# Patient Record
Sex: FEMALE | Race: WHITE | NOT HISPANIC OR LATINO
[De-identification: names, ages, dates, MRNs, and addresses within clinical notes are randomized per-mention and may not be internally consistent; named-entity substitution may affect disease eponyms.]

---

## 2017-03-06 ENCOUNTER — APPOINTMENT (OUTPATIENT)
Dept: PULMONOLOGY | Facility: CLINIC | Age: 42
End: 2017-03-06

## 2017-09-29 ENCOUNTER — APPOINTMENT (OUTPATIENT)
Dept: ORTHOPEDIC SURGERY | Facility: CLINIC | Age: 42
End: 2017-09-29

## 2017-10-22 ENCOUNTER — INPATIENT (INPATIENT)
Facility: HOSPITAL | Age: 42
LOS: 3 days | Discharge: HOME | End: 2017-10-26
Attending: HOSPITALIST | Admitting: HOSPITALIST
Payer: MEDICARE

## 2017-10-22 VITALS
TEMPERATURE: 98 F | HEART RATE: 92 BPM | SYSTOLIC BLOOD PRESSURE: 153 MMHG | DIASTOLIC BLOOD PRESSURE: 93 MMHG | OXYGEN SATURATION: 98 % | RESPIRATION RATE: 20 BRPM

## 2017-10-22 LAB
ALBUMIN SERPL ELPH-MCNC: 4.2 G/DL — SIGNIFICANT CHANGE UP (ref 3.3–5)
ALP SERPL-CCNC: 145 U/L — HIGH (ref 40–120)
ALT FLD-CCNC: 17 U/L — SIGNIFICANT CHANGE UP (ref 4–33)
AST SERPL-CCNC: 21 U/L — SIGNIFICANT CHANGE UP (ref 4–32)
BASOPHILS # BLD AUTO: 0.06 K/UL — SIGNIFICANT CHANGE UP (ref 0–0.2)
BASOPHILS NFR BLD AUTO: 0.8 % — SIGNIFICANT CHANGE UP (ref 0–2)
BILIRUB SERPL-MCNC: 0.2 MG/DL — SIGNIFICANT CHANGE UP (ref 0.2–1.2)
BUN SERPL-MCNC: 13 MG/DL — SIGNIFICANT CHANGE UP (ref 7–23)
CALCIUM SERPL-MCNC: 8.9 MG/DL — SIGNIFICANT CHANGE UP (ref 8.4–10.5)
CHLORIDE SERPL-SCNC: 103 MMOL/L — SIGNIFICANT CHANGE UP (ref 98–107)
CO2 SERPL-SCNC: 24 MMOL/L — SIGNIFICANT CHANGE UP (ref 22–31)
CREAT SERPL-MCNC: 1.18 MG/DL — SIGNIFICANT CHANGE UP (ref 0.5–1.3)
EOSINOPHIL # BLD AUTO: 0 K/UL — SIGNIFICANT CHANGE UP (ref 0–0.5)
EOSINOPHIL NFR BLD AUTO: 0 % — SIGNIFICANT CHANGE UP (ref 0–6)
GLUCOSE SERPL-MCNC: 111 MG/DL — HIGH (ref 70–99)
HBA1C BLD-MCNC: 5.5 % — SIGNIFICANT CHANGE UP (ref 4–5.6)
HCG SERPL-ACNC: < 5 MIU/ML — SIGNIFICANT CHANGE UP
HCT VFR BLD CALC: 44.2 % — SIGNIFICANT CHANGE UP (ref 34.5–45)
HGB BLD-MCNC: 14.6 G/DL — SIGNIFICANT CHANGE UP (ref 11.5–15.5)
IMM GRANULOCYTES # BLD AUTO: 0.03 # — SIGNIFICANT CHANGE UP
IMM GRANULOCYTES NFR BLD AUTO: 0.4 % — SIGNIFICANT CHANGE UP (ref 0–1.5)
LYMPHOCYTES # BLD AUTO: 3.03 K/UL — SIGNIFICANT CHANGE UP (ref 1–3.3)
LYMPHOCYTES # BLD AUTO: 40.9 % — SIGNIFICANT CHANGE UP (ref 13–44)
MCHC RBC-ENTMCNC: 29.9 PG — SIGNIFICANT CHANGE UP (ref 27–34)
MCHC RBC-ENTMCNC: 33 % — SIGNIFICANT CHANGE UP (ref 32–36)
MCV RBC AUTO: 90.6 FL — SIGNIFICANT CHANGE UP (ref 80–100)
MONOCYTES # BLD AUTO: 0.69 K/UL — SIGNIFICANT CHANGE UP (ref 0–0.9)
MONOCYTES NFR BLD AUTO: 9.3 % — SIGNIFICANT CHANGE UP (ref 2–14)
NEUTROPHILS # BLD AUTO: 3.6 K/UL — SIGNIFICANT CHANGE UP (ref 1.8–7.4)
NEUTROPHILS NFR BLD AUTO: 48.6 % — SIGNIFICANT CHANGE UP (ref 43–77)
NRBC # FLD: 0 — SIGNIFICANT CHANGE UP
PLATELET # BLD AUTO: 215 K/UL — SIGNIFICANT CHANGE UP (ref 150–400)
PMV BLD: 9.1 FL — SIGNIFICANT CHANGE UP (ref 7–13)
POTASSIUM SERPL-MCNC: 4.1 MMOL/L — SIGNIFICANT CHANGE UP (ref 3.5–5.3)
POTASSIUM SERPL-SCNC: 4.1 MMOL/L — SIGNIFICANT CHANGE UP (ref 3.5–5.3)
PROT SERPL-MCNC: 7.3 G/DL — SIGNIFICANT CHANGE UP (ref 6–8.3)
RBC # BLD: 4.88 M/UL — SIGNIFICANT CHANGE UP (ref 3.8–5.2)
RBC # FLD: 13.2 % — SIGNIFICANT CHANGE UP (ref 10.3–14.5)
SODIUM SERPL-SCNC: 141 MMOL/L — SIGNIFICANT CHANGE UP (ref 135–145)
WBC # BLD: 7.41 K/UL — SIGNIFICANT CHANGE UP (ref 3.8–10.5)
WBC # FLD AUTO: 7.41 K/UL — SIGNIFICANT CHANGE UP (ref 3.8–10.5)

## 2017-10-22 PROCEDURE — 71020: CPT | Mod: 26

## 2017-10-22 RX ORDER — LAMOTRIGINE 25 MG/1
100 TABLET, ORALLY DISINTEGRATING ORAL
Qty: 0 | Refills: 0 | Status: DISCONTINUED | OUTPATIENT
Start: 2017-10-22 | End: 2017-10-26

## 2017-10-22 RX ORDER — MAGNESIUM SULFATE 500 MG/ML
2 VIAL (ML) INJECTION ONCE
Qty: 0 | Refills: 0 | Status: COMPLETED | OUTPATIENT
Start: 2017-10-22 | End: 2017-10-22

## 2017-10-22 RX ORDER — ALBUTEROL 90 UG/1
2.5 AEROSOL, METERED ORAL ONCE
Qty: 0 | Refills: 0 | Status: COMPLETED | OUTPATIENT
Start: 2017-10-22 | End: 2017-10-22

## 2017-10-22 RX ORDER — IPRATROPIUM/ALBUTEROL SULFATE 18-103MCG
3 AEROSOL WITH ADAPTER (GRAM) INHALATION ONCE
Qty: 0 | Refills: 0 | Status: COMPLETED | OUTPATIENT
Start: 2017-10-22 | End: 2017-10-22

## 2017-10-22 RX ORDER — KETOROLAC TROMETHAMINE 30 MG/ML
15 SYRINGE (ML) INJECTION ONCE
Qty: 0 | Refills: 0 | Status: DISCONTINUED | OUTPATIENT
Start: 2017-10-22 | End: 2017-10-22

## 2017-10-22 RX ORDER — IPRATROPIUM/ALBUTEROL SULFATE 18-103MCG
3 AEROSOL WITH ADAPTER (GRAM) INHALATION ONCE
Qty: 0 | Refills: 0 | Status: COMPLETED | OUTPATIENT
Start: 2017-10-22 | End: 2017-10-23

## 2017-10-22 RX ORDER — SODIUM CHLORIDE 9 MG/ML
1000 INJECTION INTRAMUSCULAR; INTRAVENOUS; SUBCUTANEOUS
Qty: 0 | Refills: 0 | Status: DISCONTINUED | OUTPATIENT
Start: 2017-10-22 | End: 2017-10-25

## 2017-10-22 RX ORDER — MIRTAZAPINE 45 MG/1
15 TABLET, ORALLY DISINTEGRATING ORAL AT BEDTIME
Qty: 0 | Refills: 0 | Status: DISCONTINUED | OUTPATIENT
Start: 2017-10-22 | End: 2017-10-25

## 2017-10-22 RX ORDER — FLUOXETINE HCL 10 MG
40 CAPSULE ORAL DAILY
Qty: 0 | Refills: 0 | Status: DISCONTINUED | OUTPATIENT
Start: 2017-10-22 | End: 2017-10-26

## 2017-10-22 RX ORDER — CLONAZEPAM 1 MG
1.5 TABLET ORAL
Qty: 0 | Refills: 0 | Status: DISCONTINUED | OUTPATIENT
Start: 2017-10-22 | End: 2017-10-26

## 2017-10-22 RX ADMIN — Medication 3 MILLILITER(S): at 19:31

## 2017-10-22 RX ADMIN — Medication 1.5 MILLIGRAM(S): at 23:47

## 2017-10-22 RX ADMIN — Medication 3 MILLILITER(S): at 21:53

## 2017-10-22 RX ADMIN — Medication 15 MILLIGRAM(S): at 21:31

## 2017-10-22 RX ADMIN — Medication 15 MILLIGRAM(S): at 21:50

## 2017-10-22 RX ADMIN — MIRTAZAPINE 15 MILLIGRAM(S): 45 TABLET, ORALLY DISINTEGRATING ORAL at 23:47

## 2017-10-22 RX ADMIN — Medication 125 MILLIGRAM(S): at 19:44

## 2017-10-22 RX ADMIN — LAMOTRIGINE 100 MILLIGRAM(S): 25 TABLET, ORALLY DISINTEGRATING ORAL at 23:47

## 2017-10-22 RX ADMIN — Medication 3 MILLILITER(S): at 19:25

## 2017-10-22 RX ADMIN — Medication 50 GRAM(S): at 19:45

## 2017-10-22 RX ADMIN — SODIUM CHLORIDE 125 MILLILITER(S): 9 INJECTION INTRAMUSCULAR; INTRAVENOUS; SUBCUTANEOUS at 21:53

## 2017-10-22 RX ADMIN — ALBUTEROL 2.5 MILLIGRAM(S): 90 AEROSOL, METERED ORAL at 19:35

## 2017-10-22 RX ADMIN — ALBUTEROL 2.5 MILLIGRAM(S): 90 AEROSOL, METERED ORAL at 19:44

## 2017-10-22 NOTE — ED CDU PROVIDER INITIAL DAY NOTE - OBJECTIVE STATEMENT
42 yr old female smoker with pmhx of asthma never been intubated, has been hospitalized for asthma exacerbation,  bipolar, presents to ed c/o sob, chest tightness, productive cough and fatigue x 2 days.  pt states sx 5/10 compare to prior. used pump 3x/day today with mild relief, states that usually the trigger is cold weather, and this time she also had a cold + uri sx (rhinorrhea, congestion, sinus tenderness, headache).  currently denies any headaches, neck pain, visual disturbances,  fever, chills, n/v/d, chest pain, abdominal pain, urinary symptoms, numbness/weakness/tingling, recent travel, sick contact, social history. IN ER pt given nebs, steroids, magnesium peak flow increased to 170, pt still wheezing, pt in cdu for continued nebs, steroids, fluids, reassesment

## 2017-10-22 NOTE — ED CDU PROVIDER INITIAL DAY NOTE - PHYSICAL EXAMINATION
resp- bilateral diffuse wheezing,  not currently using accesory muscles,  speaking in full sentences sat 98% with nebs peak flow 170

## 2017-10-22 NOTE — ED PROVIDER NOTE - CONSTITUTIONAL, MLM
normal... well nourished, awake, alert, oriented to person, place, time/situation and mild apparent distress.

## 2017-10-22 NOTE — ED PROVIDER NOTE - PHYSICAL EXAMINATION
resp- bilateral diffuse wheezing, costal retractions, rr 22, speaking in full sentences sat 98% with nebs

## 2017-10-22 NOTE — ED ADULT TRIAGE NOTE - CHIEF COMPLAINT QUOTE
pt c/o chest tightness, sob x 2 days. pmhx asthma, bipolar. pt sts been using ventolin pump with no relief. 02 98% on RA at this time

## 2017-10-22 NOTE — ED CDU PROVIDER INITIAL DAY NOTE - ATTENDING CONTRIBUTION TO CARE
[place]: pt with asthma exacerbation.  continue with nebs, steroids and monitor Peak flow meter for improvement.

## 2017-10-22 NOTE — ED CDU PROVIDER INITIAL DAY NOTE - ENMT, MLM
Airway patent, Nasal mucosa mild hyperturbinate. Mouth with normal mucosa. Throat has no vesicles, no oropharyngeal exudates and uvula is midline.  bilateral frontal and maxillary sinus tenderness

## 2017-10-22 NOTE — ED ADULT NURSE NOTE - OBJECTIVE STATEMENT
Pt received in intake spot 12. Alert and oriented x3, ambulatory. Co asthma exacerbation x 3 days. States she has been using her inhaler with no relief. Does not have a nebulizer at home. Wheezing auscultated. Peak flow 180 prior to nebs. Co tightness to chest when breathing. Pt also became tearful and expressed that yesterday she took 4 of her 15mg Remeron at 0230 am. States "my daughter has been drinking alcohol and my other daughter cuts herself. I am just very overwhelmed. I took the pills because I wanted to fall asleep". Denies SI or HI. States she has been admitted in the past for suicidal thoughts. MD [place] made aware. Labs sent. IV placed. Continuous pulse oximetry maintained. Will monitor. Pt received in intake spot 12. Alert and oriented x3, ambulatory. Co asthma exacerbation x 3 days. States she has been using her inhaler with no relief. Does not have a nebulizer at home. Wheezing auscultated. Peak flow 180 prior to nebs. Co tightness to chest when breathing. Pt also became tearful and expressed that yesterday she took 4 of her 15mg Remeron at 0230 am. States "my daughter has been drinking alcohol and my other daughter cuts herself. I am just very overwhelmed. I took the pills because I wanted to fall asleep". States this was not a suicide attempt.  Denies SI or HI. States she has been admitted in the past for suicidal thoughts. MD [place] made aware. Labs sent. IV placed. Continuous pulse oximetry maintained. Will monitor.

## 2017-10-22 NOTE — ED PROVIDER NOTE - OBJECTIVE STATEMENT
42 yr old female smoker with hx of asthma never been intubated, bipolar, asthma presents to ed c/o sob, chest tightness, productive cough and fatigue x 2 days.  pt states sx 5/10 compare to prior. used pump 3x/day today. + uri sx (rhinorrhea, congestion, sinus tenderness, headache).  no fever, no chills, no rash, no joint pain, no n/v, no palpitations, no abd pain, no dysuria.

## 2017-10-22 NOTE — ED CDU PROVIDER INITIAL DAY NOTE - ENMT NEGATIVE STATEMENT, MLM
Ears: no ear pain and no hearing problems.Nose: no nasal congestion and no nasal drainage.Mouth/Throat: no dysphagia, no hoarseness and no throat pain.Neck: no lumps, no pain, no stiffness and no swollen glands

## 2017-10-22 NOTE — ED PROVIDER NOTE - PROGRESS NOTE DETAILS
[place]: pt mention to RN that she was having a lot of social stress ( daughter giving her issues and having verbal altercation with significant other last night) pt told boyfriend that she wants everything to end took 4 tablets of remeron instead of 1.  denies of ever having gastric lavage or medical admission for psych related or OD.  pt had 1 OD once but never tried to physically hurt self.  pt denies any physical abuse. [place]: pt still wheezing cxr clear.  sat 98% RA but subjective dyspnea.  will admit to CDU for asthma exacerbation.

## 2017-10-22 NOTE — ED PROVIDER NOTE - MEDICAL DECISION MAKING DETAILS
42 yr old female smoker with hx of asthma never been intubated, bipolar, asthma presents to ed c/o sob, chest tightness, productive cough and fatigue x 2 days.  pt states sx 5/10 compare to prior. used pump 3x/day today. + uri sx (rhinorrhea, congestion, sinus tenderness, headache).  no fever, no chills, no rash, no joint pain, no n/v, no palpitations, no abd pain, no dysuria.    pt with asthma sx but also with DM and active smoker with uri sx will treat with nebs, steroids, mag, pulse ox, obtain cxr to r/o pna as pt states always get a pna.

## 2017-10-23 DIAGNOSIS — J44.1 CHRONIC OBSTRUCTIVE PULMONARY DISEASE WITH (ACUTE) EXACERBATION: ICD-10-CM

## 2017-10-23 DIAGNOSIS — F31.9 BIPOLAR DISORDER, UNSPECIFIED: ICD-10-CM

## 2017-10-23 DIAGNOSIS — R74.8 ABNORMAL LEVELS OF OTHER SERUM ENZYMES: ICD-10-CM

## 2017-10-23 DIAGNOSIS — J34.89 OTHER SPECIFIED DISORDERS OF NOSE AND NASAL SINUSES: ICD-10-CM

## 2017-10-23 PROCEDURE — 99223 1ST HOSP IP/OBS HIGH 75: CPT

## 2017-10-23 RX ORDER — ALBUTEROL 90 UG/1
2.5 AEROSOL, METERED ORAL EVERY 4 HOURS
Qty: 0 | Refills: 0 | Status: DISCONTINUED | OUTPATIENT
Start: 2017-10-23 | End: 2017-10-23

## 2017-10-23 RX ORDER — CLONAZEPAM 1 MG
1 TABLET ORAL
Qty: 0 | Refills: 0 | COMMUNITY

## 2017-10-23 RX ORDER — LAMOTRIGINE 25 MG/1
1 TABLET, ORALLY DISINTEGRATING ORAL
Qty: 0 | Refills: 0 | COMMUNITY

## 2017-10-23 RX ORDER — AZITHROMYCIN 500 MG/1
500 TABLET, FILM COATED ORAL EVERY 24 HOURS
Qty: 0 | Refills: 0 | Status: DISCONTINUED | OUTPATIENT
Start: 2017-10-24 | End: 2017-10-26

## 2017-10-23 RX ORDER — AZITHROMYCIN 500 MG/1
500 TABLET, FILM COATED ORAL ONCE
Qty: 0 | Refills: 0 | Status: COMPLETED | OUTPATIENT
Start: 2017-10-23 | End: 2017-10-23

## 2017-10-23 RX ORDER — IPRATROPIUM/ALBUTEROL SULFATE 18-103MCG
3 AEROSOL WITH ADAPTER (GRAM) INHALATION ONCE
Qty: 0 | Refills: 0 | Status: COMPLETED | OUTPATIENT
Start: 2017-10-23 | End: 2017-10-23

## 2017-10-23 RX ORDER — BUDESONIDE AND FORMOTEROL FUMARATE DIHYDRATE 160; 4.5 UG/1; UG/1
2 AEROSOL RESPIRATORY (INHALATION)
Qty: 0 | Refills: 0 | Status: DISCONTINUED | OUTPATIENT
Start: 2017-10-23 | End: 2017-10-26

## 2017-10-23 RX ORDER — ALBUTEROL 90 UG/1
2.5 AEROSOL, METERED ORAL
Qty: 0 | Refills: 0 | Status: DISCONTINUED | OUTPATIENT
Start: 2017-10-23 | End: 2017-10-24

## 2017-10-23 RX ORDER — CEFTRIAXONE 500 MG/1
1 INJECTION, POWDER, FOR SOLUTION INTRAMUSCULAR; INTRAVENOUS EVERY 24 HOURS
Qty: 0 | Refills: 0 | Status: DISCONTINUED | OUTPATIENT
Start: 2017-10-23 | End: 2017-10-24

## 2017-10-23 RX ORDER — MIRTAZAPINE 45 MG/1
1 TABLET, ORALLY DISINTEGRATING ORAL
Qty: 0 | Refills: 0 | COMMUNITY

## 2017-10-23 RX ORDER — ALBUTEROL 90 UG/1
2.5 AEROSOL, METERED ORAL EVERY 6 HOURS
Qty: 0 | Refills: 0 | Status: DISCONTINUED | OUTPATIENT
Start: 2017-10-23 | End: 2017-10-23

## 2017-10-23 RX ORDER — FLUTICASONE PROPIONATE 50 MCG
1 SPRAY, SUSPENSION NASAL
Qty: 0 | Refills: 0 | Status: DISCONTINUED | OUTPATIENT
Start: 2017-10-23 | End: 2017-10-26

## 2017-10-23 RX ORDER — ENOXAPARIN SODIUM 100 MG/ML
40 INJECTION SUBCUTANEOUS EVERY 24 HOURS
Qty: 0 | Refills: 0 | Status: DISCONTINUED | OUTPATIENT
Start: 2017-10-23 | End: 2017-10-26

## 2017-10-23 RX ORDER — CLONAZEPAM 1 MG
3 TABLET ORAL
Qty: 0 | Refills: 0 | COMMUNITY

## 2017-10-23 RX ORDER — INFLUENZA VIRUS VACCINE 15; 15; 15; 15 UG/.5ML; UG/.5ML; UG/.5ML; UG/.5ML
0.5 SUSPENSION INTRAMUSCULAR ONCE
Qty: 0 | Refills: 0 | Status: DISCONTINUED | OUTPATIENT
Start: 2017-10-23 | End: 2017-10-26

## 2017-10-23 RX ORDER — FLUOXETINE HCL 10 MG
1 CAPSULE ORAL
Qty: 0 | Refills: 0 | COMMUNITY

## 2017-10-23 RX ORDER — ALBUTEROL 90 UG/1
2 AEROSOL, METERED ORAL
Qty: 0 | Refills: 0 | COMMUNITY

## 2017-10-23 RX ORDER — IPRATROPIUM/ALBUTEROL SULFATE 18-103MCG
3 AEROSOL WITH ADAPTER (GRAM) INHALATION ONCE
Qty: 0 | Refills: 0 | Status: DISCONTINUED | OUTPATIENT
Start: 2017-10-23 | End: 2017-10-24

## 2017-10-23 RX ORDER — ALBUTEROL 90 UG/1
2.5 AEROSOL, METERED ORAL EVERY 4 HOURS
Qty: 0 | Refills: 0 | Status: COMPLETED | OUTPATIENT
Start: 2017-10-23 | End: 2017-10-24

## 2017-10-23 RX ORDER — IBUPROFEN 200 MG
600 TABLET ORAL ONCE
Qty: 0 | Refills: 0 | Status: COMPLETED | OUTPATIENT
Start: 2017-10-23 | End: 2017-10-23

## 2017-10-23 RX ORDER — SODIUM CHLORIDE 0.65 %
1 AEROSOL, SPRAY (ML) NASAL EVERY 6 HOURS
Qty: 0 | Refills: 0 | Status: COMPLETED | OUTPATIENT
Start: 2017-10-23 | End: 2017-10-25

## 2017-10-23 RX ADMIN — Medication 3 MILLILITER(S): at 04:46

## 2017-10-23 RX ADMIN — Medication 1.5 MILLIGRAM(S): at 09:16

## 2017-10-23 RX ADMIN — Medication 1.5 MILLIGRAM(S): at 17:36

## 2017-10-23 RX ADMIN — Medication 60 MILLIGRAM(S): at 09:16

## 2017-10-23 RX ADMIN — Medication 600 MILLIGRAM(S): at 15:12

## 2017-10-23 RX ADMIN — BUDESONIDE AND FORMOTEROL FUMARATE DIHYDRATE 2 PUFF(S): 160; 4.5 AEROSOL RESPIRATORY (INHALATION) at 22:16

## 2017-10-23 RX ADMIN — SODIUM CHLORIDE 125 MILLILITER(S): 9 INJECTION INTRAMUSCULAR; INTRAVENOUS; SUBCUTANEOUS at 05:44

## 2017-10-23 RX ADMIN — Medication 60 MILLIGRAM(S): at 17:36

## 2017-10-23 RX ADMIN — ENOXAPARIN SODIUM 40 MILLIGRAM(S): 100 INJECTION SUBCUTANEOUS at 21:49

## 2017-10-23 RX ADMIN — CEFTRIAXONE 100 GRAM(S): 500 INJECTION, POWDER, FOR SOLUTION INTRAMUSCULAR; INTRAVENOUS at 21:49

## 2017-10-23 RX ADMIN — LAMOTRIGINE 100 MILLIGRAM(S): 25 TABLET, ORALLY DISINTEGRATING ORAL at 09:16

## 2017-10-23 RX ADMIN — Medication 1 SPRAY(S): at 22:17

## 2017-10-23 RX ADMIN — SODIUM CHLORIDE 125 MILLILITER(S): 9 INJECTION INTRAMUSCULAR; INTRAVENOUS; SUBCUTANEOUS at 21:49

## 2017-10-23 RX ADMIN — Medication 40 MILLIGRAM(S): at 12:27

## 2017-10-23 RX ADMIN — MIRTAZAPINE 15 MILLIGRAM(S): 45 TABLET, ORALLY DISINTEGRATING ORAL at 22:16

## 2017-10-23 RX ADMIN — AZITHROMYCIN 250 MILLIGRAM(S): 500 TABLET, FILM COATED ORAL at 09:18

## 2017-10-23 RX ADMIN — ALBUTEROL 2.5 MILLIGRAM(S): 90 AEROSOL, METERED ORAL at 15:17

## 2017-10-23 RX ADMIN — ALBUTEROL 2.5 MILLIGRAM(S): 90 AEROSOL, METERED ORAL at 21:26

## 2017-10-23 RX ADMIN — Medication 600 MILLIGRAM(S): at 15:42

## 2017-10-23 RX ADMIN — ALBUTEROL 2.5 MILLIGRAM(S): 90 AEROSOL, METERED ORAL at 09:16

## 2017-10-23 RX ADMIN — LAMOTRIGINE 100 MILLIGRAM(S): 25 TABLET, ORALLY DISINTEGRATING ORAL at 20:31

## 2017-10-23 RX ADMIN — Medication 3 MILLILITER(S): at 06:04

## 2017-10-23 RX ADMIN — Medication 60 MILLIGRAM(S): at 03:54

## 2017-10-23 NOTE — H&P ADULT - NSHPPHYSICALEXAM_GEN_ALL_CORE
EXAM:    RAD  CHEST  PA  LAT      PROCEDURE  DATE:    Oct  22  2017        INTERPRETATION:    CLINICAL  INFORMATION:  Dyspnea  x2  days.    EXAM:  PA  and  lateral  views  of  the  chest  from  10/22/2017.    COMPARISON:  Chest  radiograph  from  7/27/2016    FINDINGS:  The  lungs  are  clear.  There  are  no  pleural  effusions  or  pneumothorax.  The  cardiomediastinal  silhouette  is  unchanged.  Degenerative  changes  of  the  spine.    IMPRESSION:  Clear  lungs. PHYSICAL EXAM:      Constitutional: NAD, well-groomed, well-developed  HEENT: PERRLA, EOMI, Normal Hearing  Neck: No LAD, No JVD  Back: Normal spine flexure, No CVA tenderness  Respiratory: diffusely congested lung sounds with scarred wheezing  Cardiovascular: S1 and S2, RRR  Gastrointestinal: BS+, soft, NT/ND  Extremities: No peripheral edema  Vascular: 2+ peripheral pulses  Neurological: A/O x 3, no focal deficits  Psychiatric: Normal mood, normal affect;   Musculoskeletal: 5/5 strength b/l upper and lower extremities  Skin: No rashes

## 2017-10-23 NOTE — ED CDU PROVIDER SUBSEQUENT DAY NOTE - ATTENDING CONTRIBUTION TO CARE
pt with a h/o COPD (in ED thought to be asthma), smoker, p/w COPD exac. received nebs, steroids in ED. pt feels somewhat improved, but still SOB when going to the bathroom this am. Does not feel at her baseline. She says she gets admitted for 3-5 days twice a year for exacerbations, either due to weather or from URI induced COPDe. She takes advair occasionally but is not on any daily controller med. exam, vs wnl, nad, speaking full sentences. moderate wheeze and tight b/l. hs normal. Will continue regular nebs/steroids. Add Abx given COPD exac. If no improvement or worsening today will admit.

## 2017-10-23 NOTE — H&P ADULT - NSHPOUTPATIENTPROVIDERS_GEN_ALL_CORE
Javad Laureano-[facility]  Health Care Provider- Verenice Flowers? [phone]

## 2017-10-23 NOTE — H&P ADULT - NSHPREVIEWOFSYSTEMS_GEN_ALL_CORE
Review of Systems:   CONSTITUTIONAL: No fever, weight loss, or fatigue  EYES: No eye pain, visual disturbances, or discharge  ENMT:  No difficulty hearing, tinnitus, vertigo; No sinus or throat pain  NECK: No pain or stiffness  BREASTS: No pain, masses, or nipple discharge  RESPIRATORY: No cough, wheezing, chills or hemoptysis; No shortness of breath  CARDIOVASCULAR: No chest pain, palpitations, dizziness, or leg swelling  GASTROINTESTINAL: No abdominal or epigastric pain. No nausea, vomiting, or hematemesis; No diarrhea or constipation. No melena or hematochezia.  GENITOURINARY: No dysuria, frequency, hematuria, or incontinence  NEUROLOGICAL: No headaches, memory loss, loss of strength, numbness, or tremors  SKIN: No itching, burning, rashes, or lesions   LYMPH NODES: No enlarged glands  ENDOCRINE: No heat or cold intolerance; No hair loss  MUSCULOSKELETAL: No joint pain or swelling; No muscle, back, or extremity pain  PSYCHIATRIC: No depression, anxiety, mood swings, or difficulty sleeping  HEME/LYMPH: No easy bruising, or bleeding gums  ALLERY AND IMMUNOLOGIC: No hives or eczema Review of Systems:   CONSTITUTIONAL: No fever  EYES: No eye pain, visual disturbances, or discharge;  ENMT:  No difficulty hearing, tinnitus, vertigo; No sinus or throat pain;  frontal and maxillary region pressure  NECK: No pain or stiffness  RESPIRATORY: +cough, wheezing, chills shortness of breath  CARDIOVASCULAR: No chest pain, palpitations, dizziness, or leg swelling  GASTROINTESTINAL: No abdominal or epigastric pain. No nausea, vomiting, or hematemesis; No diarrhea or constipation. No melena or hematochezia.  GENITOURINARY: No dysuria, frequency, hematuria, or incontinence  NEUROLOGICAL: No memory loss, loss of strength, numbness, or tremors  SKIN: No itching, burning, rashes, or lesions   LYMPH NODES: No enlarged glands  ENDOCRINE: No heat or cold intolerance; No hair loss  MUSCULOSKELETAL: No joint pain or swelling; No muscle, back, or extremity pain  HEME/LYMPH: No easy bruising, or bleeding gums  ALLERY AND IMMUNOLOGIC: No hives or eczema

## 2017-10-23 NOTE — ED CDU PROVIDER DISPOSITION NOTE - CLINICAL COURSE
presented with COPD exacerbation. got nebs/steroids and did not improve. no acute worsening, no resp distress, could not walk w/o getting SOB. Typically hospitalized 3-5 days. Therefore admitted to medicine for continued management. No pulmonary unit beds available at this time.

## 2017-10-23 NOTE — ED CDU PROVIDER SUBSEQUENT DAY NOTE - BREATH SOUNDS
ins[p and exp wheezes over b/l lung fileds, but moves air well, speaks in full sentences, in no acute resp distress

## 2017-10-23 NOTE — ED CDU PROVIDER SUBSEQUENT DAY NOTE - HISTORY
Pt signed out to me by CIELO Curran at the shift change; Pt 41 y/o female with PMhx of asthma (never intubated) also hx of bipolar disorder, possible COPD (as per pt's pulm) here for eval/asthma exacerbation, still wheezing and sob while walking. Pt received mag, duonebs and steroids, minimal improvement,. Still smokes about 1/4 ppd;

## 2017-10-23 NOTE — H&P ADULT - PROBLEM SELECTOR PLAN 2
-does not meet criteria  for bacterial sinusitis  -will initiate saline irrigation, intranasal steroids; Robitussin prn

## 2017-10-23 NOTE — H&P ADULT - HISTORY OF PRESENT ILLNESS
41 yo f with obesity, diet controlled DM (following  weight loss), asthma/copd, current smoker,  bipolar disorder.  Pt with productive cough, wheezing,  for last 3 days similar to prior copd exacerbations. Attributes onset to both routine exercise regimen as well as URI/ rhinosinusitis like symptoms that began shortly prior to attack. Notes similar types of triggers 5-6 times a year. When well-controlled, reports use of rescue  inhaler 1-2 times/daily often in response to exertional stimulus such as exercise. However only uses Advair 2-3 times/weekly when she anticipates "feeling tight". Denies fevers, myalgias, nausea, vomiting. Endorses frontal and maxillary sinus region pressure which she describes as typical of prior exacerbations. CXR with no PNA     Pt teary-eyed initially; depressed because no family-members have visited her; denies SI or intent to self harm

## 2017-10-23 NOTE — H&P ADULT - NSHPLABSRESULTS_GEN_ALL_CORE
EXAM:    RAD  CHEST  PA  LAT      PROCEDURE  DATE:    Oct  22  2017        INTERPRETATION:    CLINICAL  INFORMATION:  Dyspnea  x2  days.    EXAM:  PA  and  lateral  views  of  the  chest  from  10/22/2017.    COMPARISON:  Chest  radiograph  from  7/27/2016    FINDINGS:  The  lungs  are  clear.  There  are  no  pleural  effusions  or  pneumothorax.  The  cardiomediastinal  silhouette  is  unchanged.  Degenerative  changes  of  the  spine.    IMPRESSION:  Clear  lungs.

## 2017-10-23 NOTE — H&P ADULT - PROBLEM SELECTOR PLAN 1
-continue standing and prn nebs, systemic steroids, azithromycin, O2 prn  -continue LABA/inhaled steroids; counseled that this med is standing rather than prn  -will require close-followup with pulmonologist following discharge

## 2017-10-24 DIAGNOSIS — J45.901 UNSPECIFIED ASTHMA WITH (ACUTE) EXACERBATION: ICD-10-CM

## 2017-10-24 LAB
ALBUMIN SERPL ELPH-MCNC: 3.7 G/DL — SIGNIFICANT CHANGE UP (ref 3.3–5)
ALP SERPL-CCNC: 112 U/L — SIGNIFICANT CHANGE UP (ref 40–120)
ALT FLD-CCNC: 15 U/L — SIGNIFICANT CHANGE UP (ref 4–33)
AST SERPL-CCNC: 15 U/L — SIGNIFICANT CHANGE UP (ref 4–32)
B PERT DNA SPEC QL NAA+PROBE: SIGNIFICANT CHANGE UP
BILIRUB DIRECT SERPL-MCNC: < 0.1 MG/DL — LOW (ref 0.1–0.2)
BILIRUB SERPL-MCNC: < 0.2 MG/DL — LOW (ref 0.2–1.2)
BUN SERPL-MCNC: 15 MG/DL — SIGNIFICANT CHANGE UP (ref 7–23)
C PNEUM DNA SPEC QL NAA+PROBE: NOT DETECTED — SIGNIFICANT CHANGE UP
CALCIUM SERPL-MCNC: 8.4 MG/DL — SIGNIFICANT CHANGE UP (ref 8.4–10.5)
CHLORIDE SERPL-SCNC: 107 MMOL/L — SIGNIFICANT CHANGE UP (ref 98–107)
CO2 SERPL-SCNC: 20 MMOL/L — LOW (ref 22–31)
CREAT SERPL-MCNC: 0.76 MG/DL — SIGNIFICANT CHANGE UP (ref 0.5–1.3)
FLUAV H1 2009 PAND RNA SPEC QL NAA+PROBE: NOT DETECTED — SIGNIFICANT CHANGE UP
FLUAV H1 RNA SPEC QL NAA+PROBE: NOT DETECTED — SIGNIFICANT CHANGE UP
FLUAV H3 RNA SPEC QL NAA+PROBE: NOT DETECTED — SIGNIFICANT CHANGE UP
FLUAV SUBTYP SPEC NAA+PROBE: SIGNIFICANT CHANGE UP
FLUBV RNA SPEC QL NAA+PROBE: NOT DETECTED — SIGNIFICANT CHANGE UP
GGT SERPL-CCNC: 20 U/L — SIGNIFICANT CHANGE UP (ref 5–36)
GLUCOSE SERPL-MCNC: 176 MG/DL — HIGH (ref 70–99)
HADV DNA SPEC QL NAA+PROBE: NOT DETECTED — SIGNIFICANT CHANGE UP
HCOV 229E RNA SPEC QL NAA+PROBE: NOT DETECTED — SIGNIFICANT CHANGE UP
HCOV HKU1 RNA SPEC QL NAA+PROBE: NOT DETECTED — SIGNIFICANT CHANGE UP
HCOV NL63 RNA SPEC QL NAA+PROBE: NOT DETECTED — SIGNIFICANT CHANGE UP
HCOV OC43 RNA SPEC QL NAA+PROBE: NOT DETECTED — SIGNIFICANT CHANGE UP
HCT VFR BLD CALC: 40.8 % — SIGNIFICANT CHANGE UP (ref 34.5–45)
HGB BLD-MCNC: 13 G/DL — SIGNIFICANT CHANGE UP (ref 11.5–15.5)
HMPV RNA SPEC QL NAA+PROBE: NOT DETECTED — SIGNIFICANT CHANGE UP
HPIV1 RNA SPEC QL NAA+PROBE: NOT DETECTED — SIGNIFICANT CHANGE UP
HPIV2 RNA SPEC QL NAA+PROBE: NOT DETECTED — SIGNIFICANT CHANGE UP
HPIV3 RNA SPEC QL NAA+PROBE: NOT DETECTED — SIGNIFICANT CHANGE UP
HPIV4 RNA SPEC QL NAA+PROBE: NOT DETECTED — SIGNIFICANT CHANGE UP
LDH SERPL L TO P-CCNC: 219 U/L — SIGNIFICANT CHANGE UP (ref 135–225)
M PNEUMO DNA SPEC QL NAA+PROBE: NOT DETECTED — SIGNIFICANT CHANGE UP
MCHC RBC-ENTMCNC: 29.9 PG — SIGNIFICANT CHANGE UP (ref 27–34)
MCHC RBC-ENTMCNC: 31.9 % — LOW (ref 32–36)
MCV RBC AUTO: 93.8 FL — SIGNIFICANT CHANGE UP (ref 80–100)
NRBC # FLD: 0 — SIGNIFICANT CHANGE UP
PLATELET # BLD AUTO: 202 K/UL — SIGNIFICANT CHANGE UP (ref 150–400)
PMV BLD: 9.3 FL — SIGNIFICANT CHANGE UP (ref 7–13)
POTASSIUM SERPL-MCNC: 4.8 MMOL/L — SIGNIFICANT CHANGE UP (ref 3.5–5.3)
POTASSIUM SERPL-SCNC: 4.8 MMOL/L — SIGNIFICANT CHANGE UP (ref 3.5–5.3)
PROT SERPL-MCNC: 6.5 G/DL — SIGNIFICANT CHANGE UP (ref 6–8.3)
RBC # BLD: 4.35 M/UL — SIGNIFICANT CHANGE UP (ref 3.8–5.2)
RBC # FLD: 13.4 % — SIGNIFICANT CHANGE UP (ref 10.3–14.5)
RSV RNA SPEC QL NAA+PROBE: NOT DETECTED — SIGNIFICANT CHANGE UP
RV+EV RNA SPEC QL NAA+PROBE: POSITIVE — HIGH
SODIUM SERPL-SCNC: 141 MMOL/L — SIGNIFICANT CHANGE UP (ref 135–145)
WBC # BLD: 22.93 K/UL — HIGH (ref 3.8–10.5)
WBC # FLD AUTO: 22.93 K/UL — HIGH (ref 3.8–10.5)

## 2017-10-24 PROCEDURE — 99223 1ST HOSP IP/OBS HIGH 75: CPT | Mod: GC

## 2017-10-24 PROCEDURE — 99233 SBSQ HOSP IP/OBS HIGH 50: CPT

## 2017-10-24 RX ORDER — LORATADINE 10 MG/1
10 TABLET ORAL DAILY
Qty: 0 | Refills: 0 | Status: DISCONTINUED | OUTPATIENT
Start: 2017-10-24 | End: 2017-10-26

## 2017-10-24 RX ORDER — ACETAMINOPHEN 500 MG
650 TABLET ORAL ONCE
Qty: 0 | Refills: 0 | Status: COMPLETED | OUTPATIENT
Start: 2017-10-24 | End: 2017-10-24

## 2017-10-24 RX ORDER — IPRATROPIUM/ALBUTEROL SULFATE 18-103MCG
3 AEROSOL WITH ADAPTER (GRAM) INHALATION EVERY 4 HOURS
Qty: 0 | Refills: 0 | Status: DISCONTINUED | OUTPATIENT
Start: 2017-10-24 | End: 2017-10-26

## 2017-10-24 RX ORDER — SENNA PLUS 8.6 MG/1
2 TABLET ORAL AT BEDTIME
Qty: 0 | Refills: 0 | Status: DISCONTINUED | OUTPATIENT
Start: 2017-10-24 | End: 2017-10-26

## 2017-10-24 RX ORDER — DOCUSATE SODIUM 100 MG
100 CAPSULE ORAL DAILY
Qty: 0 | Refills: 0 | Status: DISCONTINUED | OUTPATIENT
Start: 2017-10-24 | End: 2017-10-26

## 2017-10-24 RX ADMIN — LAMOTRIGINE 100 MILLIGRAM(S): 25 TABLET, ORALLY DISINTEGRATING ORAL at 05:53

## 2017-10-24 RX ADMIN — SENNA PLUS 2 TABLET(S): 8.6 TABLET ORAL at 21:01

## 2017-10-24 RX ADMIN — ENOXAPARIN SODIUM 40 MILLIGRAM(S): 100 INJECTION SUBCUTANEOUS at 21:01

## 2017-10-24 RX ADMIN — Medication 1 SPRAY(S): at 18:11

## 2017-10-24 RX ADMIN — AZITHROMYCIN 250 MILLIGRAM(S): 500 TABLET, FILM COATED ORAL at 10:22

## 2017-10-24 RX ADMIN — Medication 40 MILLIGRAM(S): at 05:54

## 2017-10-24 RX ADMIN — Medication 1.5 MILLIGRAM(S): at 18:11

## 2017-10-24 RX ADMIN — BUDESONIDE AND FORMOTEROL FUMARATE DIHYDRATE 2 PUFF(S): 160; 4.5 AEROSOL RESPIRATORY (INHALATION) at 10:22

## 2017-10-24 RX ADMIN — LAMOTRIGINE 100 MILLIGRAM(S): 25 TABLET, ORALLY DISINTEGRATING ORAL at 18:11

## 2017-10-24 RX ADMIN — Medication 200 MILLIGRAM(S): at 11:47

## 2017-10-24 RX ADMIN — Medication 3 MILLILITER(S): at 18:07

## 2017-10-24 RX ADMIN — SODIUM CHLORIDE 125 MILLILITER(S): 9 INJECTION INTRAMUSCULAR; INTRAVENOUS; SUBCUTANEOUS at 05:53

## 2017-10-24 RX ADMIN — Medication 20 MILLIGRAM(S): at 21:05

## 2017-10-24 RX ADMIN — Medication 3 MILLILITER(S): at 14:17

## 2017-10-24 RX ADMIN — Medication 650 MILLIGRAM(S): at 21:49

## 2017-10-24 RX ADMIN — Medication 1 SPRAY(S): at 11:47

## 2017-10-24 RX ADMIN — Medication 1 SPRAY(S): at 05:54

## 2017-10-24 RX ADMIN — Medication 650 MILLIGRAM(S): at 20:55

## 2017-10-24 RX ADMIN — Medication 1 SPRAY(S): at 05:53

## 2017-10-24 RX ADMIN — BUDESONIDE AND FORMOTEROL FUMARATE DIHYDRATE 2 PUFF(S): 160; 4.5 AEROSOL RESPIRATORY (INHALATION) at 21:05

## 2017-10-24 RX ADMIN — Medication 40 MILLIGRAM(S): at 11:47

## 2017-10-24 RX ADMIN — ALBUTEROL 2.5 MILLIGRAM(S): 90 AEROSOL, METERED ORAL at 01:00

## 2017-10-24 RX ADMIN — Medication 3 MILLILITER(S): at 21:40

## 2017-10-24 RX ADMIN — ALBUTEROL 2.5 MILLIGRAM(S): 90 AEROSOL, METERED ORAL at 10:07

## 2017-10-24 RX ADMIN — MIRTAZAPINE 15 MILLIGRAM(S): 45 TABLET, ORALLY DISINTEGRATING ORAL at 21:01

## 2017-10-24 RX ADMIN — Medication 1.5 MILLIGRAM(S): at 05:53

## 2017-10-24 NOTE — CONSULT NOTE ADULT - ATTENDING COMMENTS
Pt seen and examined, agree with above. 42 F with morbid obesity, likely asthma, current smoker now admitted with URI symptoms and an asthma exacerbation likely 2/2 a URI. Now with diffuse wheezing b/l. Would change steroids to solumedrol 20 mg Q8H IV. Cont Duonebs Q6H and monitor peak flows Q6-8H. Check RVP. Would continue azithromycin for possible bronchitis, no clinical evidence of pneumonia so can hold off on Ceftriaxone. Add flonase BID and continue claritin. Will follow.

## 2017-10-24 NOTE — CONSULT NOTE ADULT - ASSESSMENT
Mami Yao is a 42yF w obesity, bipolar disorder, long-term and current smoking, and asthma and/or COPD who presented on 10/22 w chest tightness and shortness of breath. In hospital, still w significant coughing and wheezing despite systemic steroids, bronchodilators, and antibiotics.     # Management Recommendations  - Continue azithromycin for total of 5 days. (Low suspicion of pneumonia, but either way useful for anti-inflammatory effects)  - Can discontinue ceftriaxone  - Methylprednisolone 20q8h  - continue albuterol/ipratropium standing  - continue fluticasone nasal spray, saline nasal spray  - continue guaifenesin cough syrup  - loratadine    # Diagnostic Recommendations  - Rapid respiratory viral panel  - Defer CT scan for now    # Outpatient Follow-up  - Ensure patient taking inhaled steroid/LABA regularly even when asymptomatic   - Peak-flow measurements regularly at home  - Pulmonology follow-up  - Sleep study

## 2017-10-24 NOTE — PROGRESS NOTE ADULT - SUBJECTIVE AND OBJECTIVE BOX
Patient is a 42y old  Female who presents with a chief complaint of sob/cough (23 Oct 2017 17:11)      SUBJECTIVE / OVERNIGHT EVENTS: still w SOB; feels anxious    MEDICATIONS  (STANDING):  ALBUTerol/ipratropium for Nebulization 3 milliLiter(s) Nebulizer every 4 hours  azithromycin  IVPB 500 milliGRAM(s) IV Intermittent every 24 hours  buDESOnide 160 MICROgram(s)/formoterol 4.5 MICROgram(s) Inhaler 2 Puff(s) Inhalation two times a day  clonazePAM Tablet 1.5 milliGRAM(s) Oral two times a day  enoxaparin Injectable 40 milliGRAM(s) SubCutaneous every 24 hours  FLUoxetine 40 milliGRAM(s) Oral daily  fluticasone propionate 50 MICROgram(s)/spray Nasal Spray 1 Spray(s) Both Nostrils two times a day  influenza   Vaccine 0.5 milliLiter(s) IntraMuscular once  lamoTRIgine 100 milliGRAM(s) Oral two times a day  loratadine 10 milliGRAM(s) Oral daily  methylPREDNISolone sodium succinate Injectable 20 milliGRAM(s) IV Push every 8 hours  mirtazapine 15 milliGRAM(s) Oral at bedtime  sodium chloride 0.65% Nasal 1 Spray(s) Both Nostrils every 6 hours  sodium chloride 0.9%. 1000 milliLiter(s) (125 mL/Hr) IV Continuous <Continuous>    MEDICATIONS  (PRN):  guaiFENesin    Syrup 200 milliGRAM(s) Oral every 6 hours PRN Cough      Meds ordered within last 24hours  (Floorstock):   Qty Removed: 3 each (10-23 @ 17:34)  (Floorstock):   Qty Removed: 1 each (10-23 @ 17:34)  ALBUTerol/ipratropium for Nebulization: Known as DUONEB  3 milliLiter(s), Nebulizer, once, Stop After 1 Doses  Special Instructions: Continue small volume nebulizer treatment until respiratory assessment and patient education determines treatment can be converted to MDI per Pharmacy and Therapeutics protocol. Discontinue nebulizer order once patient has converted to MDI.  Below is the criteria that must be met to make the conversion:  1.Patient is cooperative, alert, oriented & follows instructions  2.Patient has sufficient pre-bronchodilator inspiratory flow to be able to take a slow, deep inspiration and hold their breath f  Administration Instructions: Contains: 2.5 mG albuterol and 0.5 mG ipratropium  This is a Look-alike/Sound-alike Medication  Provider's Contact #: [phone] (10-23 @ 17:57)  influenza   Vaccine:   0.5 milliLiter(s), IntraMuscular, once  Administration Instructions: Shake well and refrigerate.  If vaccine is to be given at time of discharge, please allow a miniumum of 30 minutes for patient observation.  If to be given concomitantly with Pneumococcal Vaccine, please use a different arm for each vaccination.  This is (10-23 @ 19:25)  ALBUTerol    0.083%:   2.5 milliGRAM(s), Nebulizer, every 4 hours, PRN for Shortness of Breath and/or Wheezing  Special Instructions: Continue small volume nebulizer treatment until respiratory assessment and patient education determines treatment can be converted to MDI per Pharmacy and Therapeutics protocol.Discontinue nebulizer order  once patient has converted to MDI.  Below is the criteria that must be met to make the conversion:  1.Patient is cooperative, alert, oriented & follows instructions  2.Patient has sufficient pre-bronchodilator inspiratory flow to be able to take a slow, deep inspiration and hold their breath f  Administration Instructions: This is a Look-alike/Sound-alike Medication  Provider's Contact #: [phone] (10-23 @ 19:34)  azithromycin  IVPB: [Known as ZITHROMAX IVPB]  500 milliGRAM(s) in dextrose 5% 250 milliLiter(s), IV Intermittent, once, infuse over 60 Minute(s), Stop After 1 Doses  Indication: cap  Provider's Contact #: [phone] (10-23 @ 19:35)  azithromycin  IVPB: [Known as ZITHROMAX IVPB]  500 milliGRAM(s) in dextrose 5% 250 milliLiter(s), IV Intermittent, every 24 hours, infuse over 60 Minute(s)  Indication: cap  Provider's Contact #: [phone] (10-23 @ 19:37)  cefTRIAXone   IVPB: [Known as ROCEPHIN  IVPB]  1 Gram(s) in dextrose 5% 50 milliLiter(s), IV Intermittent, every 24 hours, infuse over 30 Minute(s)  Indication: cap  Administration Instructions: This is a Look-alike/Sound-alike Medication  Provider's Contact #: [phone] (10-23 @ 19:37)  buDESOnide 160 MICROgram(s)/formoterol 4.5 MICROgram(s) Inhaler: Known as SYMBICORT 160 MICROgram(s)  2 Puff(s), Inhalation, two times a day  Administration Instructions: shake well  Return unused medication to Pharmacy.  This is a Look-alike/Sound-alike Medication (10-23 @ 19:38)  sodium chloride 0.65% Nasal: [Known as OCEAN]  1 Spray(s), Both Nostrils, every 6 hours, Stop After 2 Days  Administration Instructions: Dispose unused medication in BLACK bin.  Provider's Contact #: [phone] (10-23 @ 19:40)  guaiFENesin    Syrup: [Ordered as ROBITUSSIN]  200 milliGRAM(s), Oral, every 6 hours, PRN for Cough  Administration Instructions: This is a Look-alike/Sound-alike Medication  Provider's Contact #: [phone] (10-23 @ 19:40)  fluticasone propionate 50 MICROgram(s)/spray Nasal Spray: [Known as FLONASE]  1 Spray(s), Both Nostrils, two times a day  Provider's Contact #: [phone] (10-23 @ 19:40)  predniSONE   Tablet: [Known as DELTASONE]  40 milliGRAM(s), Oral, daily  Administration Instructions: This is a Look-alike/Sound-alike Medication  Provider's Contact #: [phone] (10-23 @ 19:41)  ALBUTerol    0.083%:   2.5 milliGRAM(s), Nebulizer, every 4 hours, Stop After 4 Doses  Special Instructions: Continue small volume nebulizer treatment until respiratory assessment and patient education determines treatment can be converted to MDI per Pharmacy and Therapeutics protocol.Discontinue nebulizer order  once patient has converted to MDI.  Below is the criteria that must be met to make the conversion:  1.Patient is cooperative, alert, oriented & follows instructions  2.Patient has sufficient pre-bronchodilator inspiratory flow to be able to take a slow, deep inspiration and hold their breath f  Administration Instructions: This is a Look-alike/Sound-alike Medication  Provider's Contact #: [phone] (10-23 @ 19:42)  ALBUTerol   0.5%:   2.5 milliGRAM(s), Nebulizer, every 2 hours, PRN for Shortness of Breath and/or Wheezing  Administration Instructions: This is a Look-alike/Sound-alike Medication  Provider's Contact #: [phone] (10-23 @ 19:42)  enoxaparin Injectable: [Known as LOVENOX]  40 milliGRAM(s), SubCutaneous, every 24 hours  Provider's Contact #: [phone] (10-23 @ 19:45)  ALBUTerol/ipratropium for Nebulization: Known as DUONEB  3 milliLiter(s), Nebulizer, every 4 hours  Special Instructions: Continue small volume nebulizer treatment until respiratory assessment and patient education determines treatment can be converted to MDI per Pharmacy and Therapeutics protocol. Discontinue nebulizer order once patient has converted to MDI.  Below is the criteria that must be met to make the conversion:  1.Patient is cooperative, alert, oriented & follows instructions  2.Patient has sufficient pre-bronchodilator inspiratory flow to be able to take a slow, deep inspiration and hold their breath f  Administration Instructions: Contains: 2.5 mG albuterol and 0.5 mG ipratropium  This is a Look-alike/Sound-alike Medication  Provider's Contact #: [phone] (10-24 @ 12:56)  loratadine: [Ordered as CLARITIN]  10 milliGRAM(s), Oral, daily  Provider's Contact #: [phone] (10-24 @ 16:05)  methylPREDNISolone sodium succinate Injectable: [Ordered as Solu-MEDROL Injectable]  20 milliGRAM(s), IV Push, every 8 hours  Administration Instructions: Max IV Push dose 125 milliGRAM(s)  IF IV PUSH, ADMINISTER OVER 3 MIN  This is a Look-alike/Sound-alike Medication  Provider's Contact #: [phone] (10-24 @ 16:36)      T(C): 36.7 (10-24-17 @ 14:51), Max: 37.1 (10-23-17 @ 18:26)  HR: 89 (10-24-17 @ 14:51) (72 - 89)  BP: 137/76 (10-24-17 @ 14:51) (104/62 - 140/83)  RR: 18 (10-24-17 @ 14:51) (18 - 18)  SpO2: 96% (10-24-17 @ 14:51) (96% - 98%)    CAPILLARY BLOOD GLUCOSE        I&O's Summary      PHYSICAL EXAM:  GENERAL: NAD  CHEST/LUNG: + wheezes expiratory  HEART: Regular rate and rhythm; No murmurs, rubs, or gallops  ABDOMEN: Soft, Nontender, Nondistended; Bowel sounds present  EXTREMITIES:  No clubbing, cyanosis, or edema      LABS:                        13.0   22.93 )-----------( 202      ( 24 Oct 2017 05:50 )             40.8     10-24    141  |  107  |  15  ----------------------------<  176<H>  4.8   |  20<L>  |  0.76    Ca    8.4      24 Oct 2017 05:50    TPro  7.3  /  Alb  4.2  /  TBili  0.2  /  DBili  x   /  AST  21  /  ALT  17  /  AlkPhos  145<H>  10-22              RADIOLOGY & ADDITIONAL TESTS:    Imaging Personally Reviewed:    Consultant(s) Notes Reviewed:      Care Discussed with Consultants/Other Providers:

## 2017-10-24 NOTE — CONSULT NOTE ADULT - SUBJECTIVE AND OBJECTIVE BOX
HPI:  Mami Yao is a 42F w obesity (recent 80-lb wt loss w exercise), dx of diabetes mellitus (diet-controlled post-weight-loss), asthma/COPD, currently smoking, bipolar disorder (history of 3 psychiatric hospitalizations for suicidality). Presented w chief complaint of dyspnea and chest tightness.     PAST MEDICAL & SURGICAL HISTORY:  Diabetes  Bipolar 1 disorder  Asthma  Psychiatric disorder  No significant past surgical history      FAMILY HISTORY:  No pertinent family history in first degree relatives    SOCIAL HISTORY:  Smoking: Up to 1.5 packs/day x ~20 years  EtOH Use: Denies  Marital Status: Had two children w previous partner; for past 18 months has been dating someone else  Occupation: Home health aide  Exposures: Denies sick contacts  Recent Travel: No    Allergies: Seasonal  Intolerances: lurasidone (side effects including tongue swelling)        HOME MEDICATIONS:    REVIEW OF SYSTEMS:  Constitutional: No fevers or chills. No weight loss. No fatigue or generalised malaise.  Eyes: No itching or discharge from the eyes  ENT: No ear pain. No ear discharge. No nasal congestion. No post nasal drip. No epistaxis. No throat pain. No sore throat. No difficulty swallowing.   CV: No chest pain. No palpitations. No lightheadedness or dizziness.   Resp: No dyspnea at rest. No dyspnea on exertion. No orthopnea. No wheezing. No cough. No stridor. No sputum production. No chest pain with respiration.  GI: No nausea. No vomiting. No diarrhea.  MSK: No joint pain or pain in any extremities  Integumentary: No skin lesions. No pedal edema.  Neurological: No gross motor weakness. No sensory changes.    [ ] All other systems negative  [ ] Unable to assess ROS because ________    OBJECTIVE:  ICU Vital Signs Last 24 Hrs  T(C): 36.5 (24 Oct 2017 05:50), Max: 37.1 (23 Oct 2017 18:26)  T(F): 97.7 (24 Oct 2017 05:50), Max: 98.8 (23 Oct 2017 18:26)  HR: 82 (24 Oct 2017 10:08) (72 - 94)  BP: 137/83 (24 Oct 2017 05:50) (104/62 - 140/83)  BP(mean): --  ABP: --  ABP(mean): --  RR: 18 (24 Oct 2017 05:50) (17 - 18)  SpO2: 97% (24 Oct 2017 10:08) (97% - 98%)        CAPILLARY BLOOD GLUCOSE          PHYSICAL EXAM:  General: Awake, alert, oriented X 3.   HEENT: Atraumatic, normocephalic.                 Mallampatti Grade                 No nasal congestion.                No tonsillar or pharyngeal exudates.  Lymph Nodes: No palpable lymphadenopathy  Neck: No JVD. No carotid bruit.   Respiratory: Normal chest expansion                         Normal percussion                         Normal and equal air entry                         No wheeze, rhonchi or rales.  Cardiovascular: S1 S2 normal. No murmurs, rubs or gallops.   Abdomen: Soft, non-tender, non-distended. No organomegaly.  Extremities: Warm to touch. Peripheral pulse palpable. No pedal edema.   Skin: No rashes or skin lesions  Neurological: Motor and sensory examination equal and normal in all four extremities.  Psychiatry: Appropriate mood and affect.    HOSPITAL MEDICATIONS:  MEDICATIONS  (STANDING):  ALBUTerol/ipratropium for Nebulization 3 milliLiter(s) Nebulizer every 4 hours  azithromycin  IVPB 500 milliGRAM(s) IV Intermittent every 24 hours  buDESOnide 160 MICROgram(s)/formoterol 4.5 MICROgram(s) Inhaler 2 Puff(s) Inhalation two times a day  cefTRIAXone   IVPB 1 Gram(s) IV Intermittent every 24 hours  clonazePAM Tablet 1.5 milliGRAM(s) Oral two times a day  enoxaparin Injectable 40 milliGRAM(s) SubCutaneous every 24 hours  FLUoxetine 40 milliGRAM(s) Oral daily  fluticasone propionate 50 MICROgram(s)/spray Nasal Spray 1 Spray(s) Both Nostrils two times a day  influenza   Vaccine 0.5 milliLiter(s) IntraMuscular once  lamoTRIgine 100 milliGRAM(s) Oral two times a day  mirtazapine 15 milliGRAM(s) Oral at bedtime  predniSONE   Tablet 40 milliGRAM(s) Oral daily  sodium chloride 0.65% Nasal 1 Spray(s) Both Nostrils every 6 hours  sodium chloride 0.9%. 1000 milliLiter(s) (125 mL/Hr) IV Continuous <Continuous>    MEDICATIONS  (PRN):  guaiFENesin    Syrup 200 milliGRAM(s) Oral every 6 hours PRN Cough      LABS:                        13.0   22.93 )-----------( 202      ( 24 Oct 2017 05:50 )             40.8     10-24    141  |  107  |  15  ----------------------------<  176<H>  4.8   |  20<L>  |  0.76    Ca    8.4      24 Oct 2017 05:50    TPro  7.3  /  Alb  4.2  /  TBili  0.2  /  DBili  x   /  AST  21  /  ALT  17  /  AlkPhos  145<H>  10-22              MICROBIOLOGY:     RADIOLOGY:  [ ] Reviewed and interpreted by me    Point of Care Ultrasound Findings;    PFT:    EKG: HPI:  Mami Yao is a 42F w obesity (recent 80-lb wt loss w exercise), dx of diabetes mellitus (diet-controlled post-weight-loss), asthma and/or COPD (2-3 hospitalizations per year), currently smoking (2-3 packs/week; previously >1 pack/wk), bipolar disorder (history of 3 psychiatric hospitalizations for suicidality). Presented w chief complaint of dyspnea and chest tightness, preceded by ~1 day of malaise. Associated w cough productive of yellow/brown sputum.   On triage: Afebrile, HR 60s-90s, /53 to 153/93, SpO2 97+ on room air. Hospital course complicated by nasal congestion, sinus pressure, bifrontal headache, and one episode of vomiting / dry heaves on 10/24 AM.     PAST MEDICAL & SURGICAL HISTORY:  Diabetes  Bipolar 1 disorder  Asthma  Psychiatric disorder  No significant past surgical history      FAMILY HISTORY:  No pertinent family history in first degree relatives    SOCIAL HISTORY:  Smoking: Up to 1.5 packs/day x ~20 years  EtOH Use: Denies  Marital Status: Had two children w previous partner; for past 18 months has been dating someone else  Occupation: Home health aide  Exposures: Denies sick contacts  Recent Travel: No    Allergies: Seasonal  Intolerances: lurasidone (side effects including tongue swelling)    HOME MEDICATIONS:  Clonazepam 1.5 BID  Fluoxetine 40 qAM  Mirtazapine 15mg QHS  Lamotrigine 100mg BID        REVIEW OF SYSTEMS:  Constitutional: No fevers or chills. No weight loss. No fatigue or generalised malaise.  Eyes: No itching or discharge from the eyes  ENT: No ear pain. No ear discharge. No nasal congestion. No post nasal drip. No epistaxis. No throat pain. No sore throat. No difficulty swallowing.   CV: No chest pain. No palpitations. No lightheadedness or dizziness.   Resp: No dyspnea at rest. No dyspnea on exertion. No orthopnea. No wheezing. No cough. No stridor. No sputum production. No chest pain with respiration.  GI: No nausea. No vomiting. No diarrhea.  MSK: No joint pain or pain in any extremities  Integumentary: No skin lesions. No pedal edema.  Neurological: No gross motor weakness. No sensory changes.    [ ] All other systems negative  [ ] Unable to assess ROS because ________    OBJECTIVE:  ICU Vital Signs Last 24 Hrs  T(C): 36.5 (24 Oct 2017 05:50), Max: 37.1 (23 Oct 2017 18:26)  T(F): 97.7 (24 Oct 2017 05:50), Max: 98.8 (23 Oct 2017 18:26)  HR: 82 (24 Oct 2017 10:08) (72 - 94)  BP: 137/83 (24 Oct 2017 05:50) (104/62 - 140/83)  BP(mean): --  ABP: --  ABP(mean): --  RR: 18 (24 Oct 2017 05:50) (17 - 18)  SpO2: 97% (24 Oct 2017 10:08) (97% - 98%)        CAPILLARY BLOOD GLUCOSE          PHYSICAL EXAM:  General: Awake, alert, oriented X 3.   HEENT: Atraumatic, normocephalic.                 Mallampatti Grade                 No nasal congestion.                No tonsillar or pharyngeal exudates.  Lymph Nodes: No palpable lymphadenopathy  Neck: No JVD. No carotid bruit.   Respiratory: Normal chest expansion                         Normal percussion                         Normal and equal air entry                         No wheeze, rhonchi or rales.  Cardiovascular: S1 S2 normal. No murmurs, rubs or gallops.   Abdomen: Soft, non-tender, non-distended. No organomegaly.  Extremities: Warm to touch. Peripheral pulse palpable. No pedal edema.   Skin: No rashes or skin lesions  Neurological: Motor and sensory examination equal and normal in all four extremities.  Psychiatry: Appropriate mood and affect.    HOSPITAL MEDICATIONS:  MEDICATIONS  (STANDING):  ALBUTerol/ipratropium for Nebulization 3 milliLiter(s) Nebulizer every 4 hours  azithromycin  IVPB 500 milliGRAM(s) IV Intermittent every 24 hours  buDESOnide 160 MICROgram(s)/formoterol 4.5 MICROgram(s) Inhaler 2 Puff(s) Inhalation two times a day  cefTRIAXone   IVPB 1 Gram(s) IV Intermittent every 24 hours  clonazePAM Tablet 1.5 milliGRAM(s) Oral two times a day  enoxaparin Injectable 40 milliGRAM(s) SubCutaneous every 24 hours  FLUoxetine 40 milliGRAM(s) Oral daily  fluticasone propionate 50 MICROgram(s)/spray Nasal Spray 1 Spray(s) Both Nostrils two times a day  influenza   Vaccine 0.5 milliLiter(s) IntraMuscular once  lamoTRIgine 100 milliGRAM(s) Oral two times a day  mirtazapine 15 milliGRAM(s) Oral at bedtime  predniSONE   Tablet 40 milliGRAM(s) Oral daily  sodium chloride 0.65% Nasal 1 Spray(s) Both Nostrils every 6 hours  sodium chloride 0.9%. 1000 milliLiter(s) (125 mL/Hr) IV Continuous <Continuous>    MEDICATIONS  (PRN):  guaiFENesin    Syrup 200 milliGRAM(s) Oral every 6 hours PRN Cough      LABS:                        13.0   22.93 )-----------( 202      ( 24 Oct 2017 05:50 )             40.8     10-24    141  |  107  |  15  ----------------------------<  176<H>  4.8   |  20<L>  |  0.76    Ca    8.4      24 Oct 2017 05:50    TPro  7.3  /  Alb  4.2  /  TBili  0.2  /  DBili  x   /  AST  21  /  ALT  17  /  AlkPhos  145<H>  10-22              MICROBIOLOGY:     RADIOLOGY:  [ ] Reviewed and interpreted by me    Point of Care Ultrasound Findings;    PFT:    EKG: HPI:  Mami Yao is a 42F w obesity (recent 80-lb wt loss w exercise), dx of diabetes mellitus (diet-controlled post-weight-loss), asthma and/or COPD (2-3 hospitalizations per year, usually around this time of saul or in late winter/early spring), currently smoking (2-3 packs/week; previously >1 pack/wk), bipolar disorder (history of 3 psychiatric hospitalizations for suicidality). Presented w chief complaint of dyspnea and chest tightness, preceded by ~1 day of malaise. Associated w cough productive of yellow/brown sputum.   On triage: Afebrile, HR 60s-90s, /53 to 153/93, SpO2 97+ on room air. Hospital course complicated by nasal congestion, sinus pressure, bifrontal headache, one episode of vomiting / dry heaves on 10/24 AM, and one episode of midline-chest pressure that improved w clonazepam.     PAST MEDICAL & SURGICAL HISTORY:  Diabetes  Bipolar 1 disorder  Asthma  Psychiatric disorder  No significant past surgical history      FAMILY HISTORY:  No pertinent family history in first degree relatives    SOCIAL HISTORY:  Smoking: Up to 1.5 packs/day x ~20 years  EtOH Use: Denies  Marital Status: Had two children w previous partner; for past 18 months has been dating someone else  Occupation: Home health aide  Exposures: Denies sick contacts  Recent Travel: No    Allergies: Seasonal  Intolerances: lurasidone (side effects including tongue swelling)    HOME MEDICATIONS:  Fluticasone / salmeterol (patient learned recently that she is supposed to take this regularly; she had been using it only in exacerations)\  Albuterol rescue inhaler (uses few times / week)  Loratadine (uses seasonally)   Clonazepam 1.5 BID  Fluoxetine 40 qAM  Mirtazapine 15mg QHS  Lamotrigine 100mg BID      REVIEW OF SYSTEMS:  Constitutional: No fevers or chills. No weight loss. No fatigue or generalised malaise.  Eyes: No itching or discharge from the eyes  ENT: No ear pain. No ear discharge. No nasal congestion. No post nasal drip. No epistaxis. No throat pain. No sore throat. No difficulty swallowing.   CV: No chest pain. No palpitations. No lightheadedness or dizziness.   Resp: No dyspnea at rest. No dyspnea on exertion. No orthopnea. No wheezing. No cough. No stridor. No sputum production. No chest pain with respiration.  GI: No nausea. No vomiting. No diarrhea.  MSK: No joint pain or pain in any extremities  Integumentary: No skin lesions. No pedal edema.  Neurological: No gross motor weakness. No sensory changes.    [ ] All other systems negative  [ ] Unable to assess ROS because ________    OBJECTIVE:  ICU Vital Signs Last 24 Hrs  T(C): 36.5 (24 Oct 2017 05:50), Max: 37.1 (23 Oct 2017 18:26)  T(F): 97.7 (24 Oct 2017 05:50), Max: 98.8 (23 Oct 2017 18:26)  HR: 82 (24 Oct 2017 10:08) (72 - 94)  BP: 137/83 (24 Oct 2017 05:50) (104/62 - 140/83)  RR: 18 (24 Oct 2017 05:50) (17 - 18)  SpO2: 97% (24 Oct 2017 10:08) (97% - 98%)        CAPILLARY BLOOD GLUCOSE          PHYSICAL EXAM:  General: Awake, alert, oriented X 3.   HEENT: Uvula visible in back of throat.   Respiratory: At rest breathing comfortably on room air, but when asked to take deep breaths, coughs and has wheezes that are audible without stethoscope (and throughout both lung fields on auscultation).   Cardiovascular: S1 S2 normal.    Abdomen: Soft, non-tender, obese.   Extremities: Warm to touch. No pedal edema.   Skin: Hirsutism of chin. Tattoos on multiple extremities. Erythema of upper anterior chest.   Neurological: Moves all four extremities.   Psychiatry: Coherent and goal-oriented. Anxious, somewhat sad affect.     HOSPITAL MEDICATIONS:  MEDICATIONS  (STANDING):  ALBUTerol/ipratropium for Nebulization 3 milliLiter(s) Nebulizer every 4 hours  azithromycin  IVPB 500 milliGRAM(s) IV Intermittent every 24 hours  buDESOnide 160 MICROgram(s)/formoterol 4.5 MICROgram(s) Inhaler 2 Puff(s) Inhalation two times a day  cefTRIAXone   IVPB 1 Gram(s) IV Intermittent every 24 hours  clonazePAM Tablet 1.5 milliGRAM(s) Oral two times a day  enoxaparin Injectable 40 milliGRAM(s) SubCutaneous every 24 hours  FLUoxetine 40 milliGRAM(s) Oral daily  fluticasone propionate 50 MICROgram(s)/spray Nasal Spray 1 Spray(s) Both Nostrils two times a day  influenza   Vaccine 0.5 milliLiter(s) IntraMuscular once  lamoTRIgine 100 milliGRAM(s) Oral two times a day  mirtazapine 15 milliGRAM(s) Oral at bedtime  predniSONE   Tablet 40 milliGRAM(s) Oral daily  sodium chloride 0.65% Nasal 1 Spray(s) Both Nostrils every 6 hours  sodium chloride 0.9%. 1000 milliLiter(s) (125 mL/Hr) IV Continuous <Continuous>    MEDICATIONS  (PRN):  guaiFENesin    Syrup 200 milliGRAM(s) Oral every 6 hours PRN Cough      LABS:                        13.0   22.93 )-----------( 202      ( 24 Oct 2017 05:50 )             40.8     10-24    141  |  107  |  15  ----------------------------<  176<H>  4.8   |  20<L>  |  0.76    Ca    8.4      24 Oct 2017 05:50    TPro  7.3  /  Alb  4.2  /  TBili  0.2  /  DBili  x   /  AST  21  /  ALT  17  /  AlkPhos  145<H>  10-22      MICROBIOLOGY:       RADIOLOGY:  XRay Chest 2017-10-22 "IMPRESSION: Clear lungs."    [x] Reviewed and interpreted by me    Point of Care Ultrasound Findings: Deferrred    Peak Flow: 200-270 today per patient    PFT:    EKG:

## 2017-10-25 DIAGNOSIS — J45.909 UNSPECIFIED ASTHMA, UNCOMPLICATED: ICD-10-CM

## 2017-10-25 LAB
BUN SERPL-MCNC: 15 MG/DL — SIGNIFICANT CHANGE UP (ref 7–23)
CALCIUM SERPL-MCNC: 8.5 MG/DL — SIGNIFICANT CHANGE UP (ref 8.4–10.5)
CHLORIDE SERPL-SCNC: 108 MMOL/L — HIGH (ref 98–107)
CO2 SERPL-SCNC: 23 MMOL/L — SIGNIFICANT CHANGE UP (ref 22–31)
CREAT SERPL-MCNC: 0.83 MG/DL — SIGNIFICANT CHANGE UP (ref 0.5–1.3)
GLUCOSE SERPL-MCNC: 120 MG/DL — HIGH (ref 70–99)
HCT VFR BLD CALC: 41 % — SIGNIFICANT CHANGE UP (ref 34.5–45)
HGB BLD-MCNC: 13.2 G/DL — SIGNIFICANT CHANGE UP (ref 11.5–15.5)
MCHC RBC-ENTMCNC: 30.6 PG — SIGNIFICANT CHANGE UP (ref 27–34)
MCHC RBC-ENTMCNC: 32.2 % — SIGNIFICANT CHANGE UP (ref 32–36)
MCV RBC AUTO: 94.9 FL — SIGNIFICANT CHANGE UP (ref 80–100)
NRBC # FLD: 0 — SIGNIFICANT CHANGE UP
PLATELET # BLD AUTO: 175 K/UL — SIGNIFICANT CHANGE UP (ref 150–400)
PMV BLD: 9.1 FL — SIGNIFICANT CHANGE UP (ref 7–13)
POTASSIUM SERPL-MCNC: 5.1 MMOL/L — SIGNIFICANT CHANGE UP (ref 3.5–5.3)
POTASSIUM SERPL-SCNC: 5.1 MMOL/L — SIGNIFICANT CHANGE UP (ref 3.5–5.3)
RBC # BLD: 4.32 M/UL — SIGNIFICANT CHANGE UP (ref 3.8–5.2)
RBC # FLD: 13.7 % — SIGNIFICANT CHANGE UP (ref 10.3–14.5)
SODIUM SERPL-SCNC: 142 MMOL/L — SIGNIFICANT CHANGE UP (ref 135–145)
WBC # BLD: 13.54 K/UL — HIGH (ref 3.8–10.5)
WBC # FLD AUTO: 13.54 K/UL — HIGH (ref 3.8–10.5)

## 2017-10-25 PROCEDURE — 99223 1ST HOSP IP/OBS HIGH 75: CPT

## 2017-10-25 PROCEDURE — 99233 SBSQ HOSP IP/OBS HIGH 50: CPT

## 2017-10-25 PROCEDURE — 99233 SBSQ HOSP IP/OBS HIGH 50: CPT | Mod: GC

## 2017-10-25 PROCEDURE — 93010 ELECTROCARDIOGRAM REPORT: CPT

## 2017-10-25 RX ORDER — HYDROXYZINE HCL 10 MG
50 TABLET ORAL AT BEDTIME
Qty: 0 | Refills: 0 | Status: DISCONTINUED | OUTPATIENT
Start: 2017-10-25 | End: 2017-10-26

## 2017-10-25 RX ORDER — IPRATROPIUM/ALBUTEROL SULFATE 18-103MCG
3 AEROSOL WITH ADAPTER (GRAM) INHALATION
Qty: 0 | Refills: 0 | Status: DISCONTINUED | OUTPATIENT
Start: 2017-10-25 | End: 2017-10-26

## 2017-10-25 RX ORDER — ALBUTEROL 90 UG/1
1 AEROSOL, METERED ORAL EVERY 4 HOURS
Qty: 0 | Refills: 0 | Status: DISCONTINUED | OUTPATIENT
Start: 2017-10-25 | End: 2017-10-26

## 2017-10-25 RX ORDER — HALOPERIDOL DECANOATE 100 MG/ML
5 INJECTION INTRAMUSCULAR EVERY 6 HOURS
Qty: 0 | Refills: 0 | Status: DISCONTINUED | OUTPATIENT
Start: 2017-10-25 | End: 2017-10-26

## 2017-10-25 RX ORDER — TIOTROPIUM BROMIDE 18 UG/1
1 CAPSULE ORAL; RESPIRATORY (INHALATION) DAILY
Qty: 0 | Refills: 0 | Status: DISCONTINUED | OUTPATIENT
Start: 2017-10-25 | End: 2017-10-26

## 2017-10-25 RX ADMIN — Medication 1 SPRAY(S): at 05:22

## 2017-10-25 RX ADMIN — AZITHROMYCIN 250 MILLIGRAM(S): 500 TABLET, FILM COATED ORAL at 09:58

## 2017-10-25 RX ADMIN — Medication 40 MILLIGRAM(S): at 11:15

## 2017-10-25 RX ADMIN — Medication 1 SPRAY(S): at 11:16

## 2017-10-25 RX ADMIN — Medication 100 MILLIGRAM(S): at 11:16

## 2017-10-25 RX ADMIN — ENOXAPARIN SODIUM 40 MILLIGRAM(S): 100 INJECTION SUBCUTANEOUS at 21:59

## 2017-10-25 RX ADMIN — LORATADINE 10 MILLIGRAM(S): 10 TABLET ORAL at 11:16

## 2017-10-25 RX ADMIN — BUDESONIDE AND FORMOTEROL FUMARATE DIHYDRATE 2 PUFF(S): 160; 4.5 AEROSOL RESPIRATORY (INHALATION) at 09:57

## 2017-10-25 RX ADMIN — Medication 20 MILLIGRAM(S): at 12:51

## 2017-10-25 RX ADMIN — BUDESONIDE AND FORMOTEROL FUMARATE DIHYDRATE 2 PUFF(S): 160; 4.5 AEROSOL RESPIRATORY (INHALATION) at 20:48

## 2017-10-25 RX ADMIN — Medication 1 SPRAY(S): at 00:50

## 2017-10-25 RX ADMIN — Medication 3 MILLILITER(S): at 16:59

## 2017-10-25 RX ADMIN — Medication 20 MILLIGRAM(S): at 22:00

## 2017-10-25 RX ADMIN — Medication 1.5 MILLIGRAM(S): at 17:51

## 2017-10-25 RX ADMIN — Medication 3 MILLILITER(S): at 20:47

## 2017-10-25 RX ADMIN — Medication 3 MILLILITER(S): at 13:44

## 2017-10-25 RX ADMIN — Medication 3 MILLILITER(S): at 05:30

## 2017-10-25 RX ADMIN — Medication 3 MILLILITER(S): at 01:43

## 2017-10-25 RX ADMIN — Medication 1 SPRAY(S): at 17:51

## 2017-10-25 RX ADMIN — Medication 20 MILLIGRAM(S): at 05:38

## 2017-10-25 RX ADMIN — Medication 3 MILLILITER(S): at 09:14

## 2017-10-25 RX ADMIN — Medication 50 MILLIGRAM(S): at 22:00

## 2017-10-25 RX ADMIN — LAMOTRIGINE 100 MILLIGRAM(S): 25 TABLET, ORALLY DISINTEGRATING ORAL at 17:51

## 2017-10-25 RX ADMIN — SENNA PLUS 2 TABLET(S): 8.6 TABLET ORAL at 22:00

## 2017-10-25 RX ADMIN — LAMOTRIGINE 100 MILLIGRAM(S): 25 TABLET, ORALLY DISINTEGRATING ORAL at 05:37

## 2017-10-25 RX ADMIN — Medication 1.5 MILLIGRAM(S): at 05:37

## 2017-10-25 NOTE — BEHAVIORAL HEALTH ASSESSMENT NOTE - HPI (INCLUDE ILLNESS QUALITY, SEVERITY, DURATION, TIMING, CONTEXT, MODIFYING FACTORS, ASSOCIATED SIGNS AND SYMPTOMS)
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  Pt has been anxious in hospital.  Psych consulted for anxiety.    Seen and examined at bedside.  Pt AAOx4, cooperative with interview. 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  Pt has been anxious in hospital.  Psych consulted for anxiety.    Seen and examined at bedside.  Pt's mother and stepfather are present.  Pt AAOx4, cooperative with interview.  Pt reports feeling anxious in the hospital.  States she has difficulty breathing and feels alone at times (daughters have not called, boyfriend at work).  Also reports difficulty sleeping which is not relieved by Remeron.  Denies SI/HI/AH/VH or psychotic symptoms.  Pt reports stable mood over the last year.  Reports previous treatment with prednisone.  Reports increased irritability during treatment that spontaneously resolved.  Describes irritability and lability (anger then sobbing) when psychiatrically unstable.  Reports psychiatric follow up (med management with Dr. Perez and therapy) at [facility] in [place] for the last 4 months.  Reports recent change in providers because previous provider had prescribed multiple medications.  Pt reports psychiatrist is currently tapering medications and planning to start Lithium.  Pt reports 3 past psychiatric hospitalizations, last in 2012.  Denies past suicide attempts.  Reports currently smoking 1 pack every 3 days.  Pt reports living with parents, 17yo daughter, and other family members.  Pt's 17yo daughter is currently under care of pt's parents.  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      Spoke with pt's mother Cesar ([phone]).  She denies acute psychiatric symptoms.  States pt can be short tempered and easily angered.  Denies safety concerns.    Left message for pt's psychiatrist Dr. Perez ([phone]).     Ref# [id number]

## 2017-10-25 NOTE — BEHAVIORAL HEALTH ASSESSMENT NOTE - RISK ASSESSMENT
Pt with chronic risk factors of hx of bipolar, hx of past hospitalizations, single.  Protective factors include stable domicile, engagement in care, positive therapeutic relationships, family support, domiciled with family, no past SA, no family hx of SA, no substance use.  Pt denies SI/HI/AH/VH.  She does not present an acute risk of harm to self or others at this time.

## 2017-10-25 NOTE — BEHAVIORAL HEALTH ASSESSMENT NOTE - NSBHCHARTREVIEWLAB_PSY_A_CORE FT
13.2   13.54 )-----------( 175      ( 25 Oct 2017 05:30 )             41.0   10-25    142  |  108<H>  |  15  ----------------------------<  120<H>  5.1   |  23  |  0.83    Ca    8.5      25 Oct 2017 05:30    TPro  6.5  /  Alb  3.7  /  TBili  < 0.2<L>  /  DBili  < 0.1<L>  /  AST  15  /  ALT  15  /  AlkPhos  112  10-24

## 2017-10-25 NOTE — BEHAVIORAL HEALTH ASSESSMENT NOTE - NSBHCONSULTFOLLOWAFTERCARE_PSY_A_CORE FT
Continue psychiatric f/u (psychiatrist Dr. Perez, therapy) at [facility] ([phone]).

## 2017-10-25 NOTE — PROGRESS NOTE ADULT - SUBJECTIVE AND OBJECTIVE BOX
CHIEF COMPLAINT:    Interval Events:    REVIEW OF SYSTEMS:  Constitutional: [ ] fevers [ ] chills [ ] weight loss [ ] weight gain  HEENT: [ ] dry eyes [ ] eye irritation [ ] postnasal drip [ ] nasal congestion  CV: [ ] chest pain [ ] orthopnea [ ] palpitations [ ] murmur  Resp: [ ] cough [ ] shortness of breath [ ] dyspnea [ ] wheezing [ ] sputum [ ] hemoptysis  GI: [ ] nausea [ ] vomiting [ ] diarrhea [ ] constipation [ ] abd pain [ ] dysphagia   Skin: [ ] rash [ ] itch  Neurological: [ ] headache [ ] dizziness [ ] syncope [ ] weakness [ ] numbness  Psychiatric: [ +] anxiety [ ] depression  Allergic/Immunologic: [ ] itchy eyes [ ] nasal discharge [ ] hives [ ] angioedema  [ ] All other systems negative  [ ] Unable to assess ROS because ________    OBJECTIVE:  ICU Vital Signs Last 24 Hrs  T(C): 36.7 (25 Oct 2017 06:03), Max: 36.7 (24 Oct 2017 14:51)  T(F): 98 (25 Oct 2017 06:03), Max: 98.1 (24 Oct 2017 14:51)  HR: 75 (25 Oct 2017 06:03) (63 - 89)  BP: 122/76 (25 Oct 2017 06:03) (120/75 - 137/76)  BP(mean): --  ABP: --  ABP(mean): --  RR: 18 (25 Oct 2017 06:03) (18 - 18)  SpO2: 95% (25 Oct 2017 06:03) (95% - 98%)        CAPILLARY BLOOD GLUCOSE          PHYSICAL EXAM:  General:   HEENT:   Neck:   Respiratory:   Cardiovascular:   Abdomen:   Extremities:   Skin:   Neurological:  Psychiatry:    HOSPITAL MEDICATIONS:  MEDICATIONS  (STANDING):  ALBUTerol/ipratropium for Nebulization 3 milliLiter(s) Nebulizer every 4 hours  azithromycin  IVPB 500 milliGRAM(s) IV Intermittent every 24 hours  buDESOnide 160 MICROgram(s)/formoterol 4.5 MICROgram(s) Inhaler 2 Puff(s) Inhalation two times a day  clonazePAM Tablet 1.5 milliGRAM(s) Oral two times a day  docusate sodium 100 milliGRAM(s) Oral daily  enoxaparin Injectable 40 milliGRAM(s) SubCutaneous every 24 hours  FLUoxetine 40 milliGRAM(s) Oral daily  fluticasone propionate 50 MICROgram(s)/spray Nasal Spray 1 Spray(s) Both Nostrils two times a day  influenza   Vaccine 0.5 milliLiter(s) IntraMuscular once  lamoTRIgine 100 milliGRAM(s) Oral two times a day  loratadine 10 milliGRAM(s) Oral daily  methylPREDNISolone sodium succinate Injectable 20 milliGRAM(s) IV Push every 8 hours  mirtazapine 15 milliGRAM(s) Oral at bedtime  senna 2 Tablet(s) Oral at bedtime  sodium chloride 0.65% Nasal 1 Spray(s) Both Nostrils every 6 hours  sodium chloride 0.9%. 1000 milliLiter(s) (125 mL/Hr) IV Continuous <Continuous>    MEDICATIONS  (PRN):  guaiFENesin    Syrup 200 milliGRAM(s) Oral every 6 hours PRN Cough      LABS:                        13.2   13.54 )-----------( 175      ( 25 Oct 2017 05:30 )             41.0     Hgb Trend: 13.2<--, 13.0<--, 14.6<--  10-25    142  |  108<H>  |  15  ----------------------------<  120<H>  5.1   |  23  |  0.83    Ca    8.5      25 Oct 2017 05:30    TPro  6.5  /  Alb  3.7  /  TBili  < 0.2<L>  /  DBili  < 0.1<L>  /  AST  15  /  ALT  15  /  AlkPhos  112  10-24    Creatinine Trend: 0.83<--, 0.76<--, 1.18<--            MICROBIOLOGY:   Rapid Respiratory Viral Panel (10.24.17 @ 17:00)    Adenovirus (RapRVP): NOT DETECTED    HKU1 Coronovirus (RapRVP): NOT DETECTED    NL63 Coronovirus (RapRVP): NOT DETECTED    229E Coronovirus (RapRVP): NOT DETECTED    OC43 Coronovirus (RapRVP): NOT DETECTED    hMPV (RapRVP): NOT DETECTED    Entero/Rhinovirus (RapRVP): POSITIVE    Influenza A (RapRVP): NOT DETECTED (any subtype)    Influenza AH1 2009 (RapRVP): NOT DETECTED    Influenza AH1 (RapRVP): NOT DETECTED    Influenza AH3 (RapRVP): NOT DETECTED    Influenza B (RapRVP): NOT DETECTED    Parainfluenza 1 (RapRVP): NOT DETECTED    Parainfluenza 2 (RapRVP): NOT DETECTED    Parainfluenza 3 (RapRVP): NOT DETECTED    Parainfluenza 4 (RapRVP): NOT DETECTED    Resp Syncytial Virus (RapRVP): NOT DETECTED    Bordetella pertussis (RapRVP): NOT DETECTED    Chlamydia pneumoniae (RapRVP): NOT DETECTED    Mycoplasma pneumoniae (RapRVP): NOT DETECTED           RADIOLOGY:  [ ] Reviewed and interpreted by me CHIEF COMPLAINT: SOB    Interval Events:  no o/n events. Feeling better today, the nasal spray has been helping. Cough and wheezing improved. Pt reports that her peak flows are between 250-270, which has been improving     REVIEW OF SYSTEMS:  Constitutional: [- ] fevers [ -] chills [ ] weight loss [ ] weight gain  HEENT: [ ] dry eyes [ ] eye irritation [ +] postnasal drip [+ ] nasal congestion  CV: [- ] chest pain [ ] orthopnea [ -] palpitations [ ] murmur  Resp: [+ ] cough [- ] shortness of breath [- ] dyspnea [ ] wheezing [+ ] sputum [- ] hemoptysis  GI: [- ] nausea [- ] vomiting [- ] diarrhea [ ] constipation [ -] abd pain [ ] dysphagia   Skin: [ -] rash [ ] itch  Psychiatric: [ +] anxiety [ ] depression  [x ] All other systems negative  [ ] Unable to assess ROS because ________    OBJECTIVE:  ICU Vital Signs Last 24 Hrs  T(C): 36.7 (25 Oct 2017 06:03), Max: 36.7 (24 Oct 2017 14:51)  T(F): 98 (25 Oct 2017 06:03), Max: 98.1 (24 Oct 2017 14:51)  HR: 75 (25 Oct 2017 06:03) (63 - 89)  BP: 122/76 (25 Oct 2017 06:03) (120/75 - 137/76)  BP(mean): --  ABP: --  ABP(mean): --  RR: 18 (25 Oct 2017 06:03) (18 - 18)  SpO2: 95% (25 Oct 2017 06:03) (95% - 98%)        CAPILLARY BLOOD GLUCOSE          PHYSICAL EXAM:  General: NAD, nasal congestion improved  HEENT: MMM, TTP over sinuses  Neck: supple  Respiratory: diffuse wheezing- improved from yesterday, better air movement throughout  Cardiovascular: s1s2 RRR  Abdomen: obese, soft, NT, ND  Extremities: warm, no edema  Skin: intact  Neurological: non-focal   Psychiatry: AAOx3    HOSPITAL MEDICATIONS:  MEDICATIONS  (STANDING):  ALBUTerol/ipratropium for Nebulization 3 milliLiter(s) Nebulizer every 4 hours  azithromycin  IVPB 500 milliGRAM(s) IV Intermittent every 24 hours  buDESOnide 160 MICROgram(s)/formoterol 4.5 MICROgram(s) Inhaler 2 Puff(s) Inhalation two times a day  clonazePAM Tablet 1.5 milliGRAM(s) Oral two times a day  docusate sodium 100 milliGRAM(s) Oral daily  enoxaparin Injectable 40 milliGRAM(s) SubCutaneous every 24 hours  FLUoxetine 40 milliGRAM(s) Oral daily  fluticasone propionate 50 MICROgram(s)/spray Nasal Spray 1 Spray(s) Both Nostrils two times a day  influenza   Vaccine 0.5 milliLiter(s) IntraMuscular once  lamoTRIgine 100 milliGRAM(s) Oral two times a day  loratadine 10 milliGRAM(s) Oral daily  methylPREDNISolone sodium succinate Injectable 20 milliGRAM(s) IV Push every 8 hours  mirtazapine 15 milliGRAM(s) Oral at bedtime  senna 2 Tablet(s) Oral at bedtime  sodium chloride 0.65% Nasal 1 Spray(s) Both Nostrils every 6 hours  sodium chloride 0.9%. 1000 milliLiter(s) (125 mL/Hr) IV Continuous <Continuous>    MEDICATIONS  (PRN):  guaiFENesin    Syrup 200 milliGRAM(s) Oral every 6 hours PRN Cough      LABS:                        13.2   13.54 )-----------( 175      ( 25 Oct 2017 05:30 )             41.0     Hgb Trend: 13.2<--, 13.0<--, 14.6<--  10-25    142  |  108<H>  |  15  ----------------------------<  120<H>  5.1   |  23  |  0.83    Ca    8.5      25 Oct 2017 05:30    TPro  6.5  /  Alb  3.7  /  TBili  < 0.2<L>  /  DBili  < 0.1<L>  /  AST  15  /  ALT  15  /  AlkPhos  112  10-24    Creatinine Trend: 0.83<--, 0.76<--, 1.18<--            MICROBIOLOGY:   Rapid Respiratory Viral Panel (10.24.17 @ 17:00)    Adenovirus (RapRVP): NOT DETECTED    HKU1 Coronovirus (RapRVP): NOT DETECTED    NL63 Coronovirus (RapRVP): NOT DETECTED    229E Coronovirus (RapRVP): NOT DETECTED    OC43 Coronovirus (RapRVP): NOT DETECTED    hMPV (RapRVP): NOT DETECTED    Entero/Rhinovirus (RapRVP): POSITIVE    Influenza A (RapRVP): NOT DETECTED (any subtype)    Influenza AH1 2009 (RapRVP): NOT DETECTED    Influenza AH1 (RapRVP): NOT DETECTED    Influenza AH3 (RapRVP): NOT DETECTED    Influenza B (RapRVP): NOT DETECTED    Parainfluenza 1 (RapRVP): NOT DETECTED    Parainfluenza 2 (RapRVP): NOT DETECTED    Parainfluenza 3 (RapRVP): NOT DETECTED    Parainfluenza 4 (RapRVP): NOT DETECTED    Resp Syncytial Virus (RapRVP): NOT DETECTED    Bordetella pertussis (RapRVP): NOT DETECTED    Chlamydia pneumoniae (RapRVP): NOT DETECTED    Mycoplasma pneumoniae (RapRVP): NOT DETECTED           RADIOLOGY:  [ ] Reviewed and interpreted by me

## 2017-10-25 NOTE — PROGRESS NOTE ADULT - SUBJECTIVE AND OBJECTIVE BOX
Patient is a 42y old  Female who presents with a chief complaint of sob/cough (23 Oct 2017 17:11)      SUBJECTIVE / OVERNIGHT EVENTS: pt still feels chest tightness; sob on walking when seen at 1230p- upset about  her mother; stated that steroids make her jittery    MEDICATIONS  (STANDING):  ALBUTerol    90 MICROgram(s) HFA Inhaler 1 Puff(s) Inhalation every 4 hours  ALBUTerol/ipratropium for Nebulization 3 milliLiter(s) Nebulizer every 4 hours  azithromycin  IVPB 500 milliGRAM(s) IV Intermittent every 24 hours  buDESOnide 160 MICROgram(s)/formoterol 4.5 MICROgram(s) Inhaler 2 Puff(s) Inhalation two times a day  clonazePAM Tablet 1.5 milliGRAM(s) Oral two times a day  docusate sodium 100 milliGRAM(s) Oral daily  enoxaparin Injectable 40 milliGRAM(s) SubCutaneous every 24 hours  FLUoxetine 40 milliGRAM(s) Oral daily  fluticasone propionate 50 MICROgram(s)/spray Nasal Spray 1 Spray(s) Both Nostrils two times a day  hydrOXYzine hydrochloride 50 milliGRAM(s) Oral at bedtime  influenza   Vaccine 0.5 milliLiter(s) IntraMuscular once  lamoTRIgine 100 milliGRAM(s) Oral two times a day  loratadine 10 milliGRAM(s) Oral daily  methylPREDNISolone sodium succinate Injectable 20 milliGRAM(s) IV Push every 8 hours  senna 2 Tablet(s) Oral at bedtime  sodium chloride 0.65% Nasal 1 Spray(s) Both Nostrils every 6 hours  tiotropium 18 MICROgram(s) Capsule 1 Capsule(s) Inhalation daily    MEDICATIONS  (PRN):  ALBUTerol/ipratropium for Nebulization 3 milliLiter(s) Nebulizer every 2 hours PRN sob/wheezing  guaiFENesin    Syrup 200 milliGRAM(s) Oral every 6 hours PRN Cough  haloperidol     Tablet 5 milliGRAM(s) Oral every 6 hours PRN Agitation  haloperidol    Injectable 5 milliGRAM(s) IV Push every 6 hours PRN Agitation  haloperidol    Injectable 5 milliGRAM(s) IntraMuscular every 6 hours PRN Agitation  hydrOXYzine hydrochloride 50 milliGRAM(s) Oral at bedtime PRN Persistent Insomnia  LORazepam     Tablet 1 milliGRAM(s) Oral every 6 hours PRN Anxiety      Meds ordered within last 24hours  acetaminophen   Tablet.: [Ordered as TYLENOL.]  650 milliGRAM(s), Oral, once, Stop After 1 Doses  Administration Instructions: MAX DAILY DOSE:  ADULT = 4,000 mG/Day  Provider's Contact #: [phone] (10-24 @ 20:30)  docusate sodium: [Ordered as COLACE]  100 milliGRAM(s), Oral, daily  Provider's Contact #: [phone] (10-24 @ 20:30)  senna:   2 Tablet(s), Oral, at bedtime  Provider's Contact #: [phone] (10-24 @ 20:30)  ALBUTerol/ipratropium for Nebulization: Known as DUONEB  3 milliLiter(s), Nebulizer, every 2 hours, PRN for sob/wheezing  Special Instructions: Continue small volume nebulizer treatment until respiratory assessment and patient education determines treatment can be converted to MDI per [employer] [employer] protocol. Discontinue nebulizer order once patient has converted to MDI.  Below is the criteria that must be met to make the conversion:  1.Patient is cooperative, alert, oriented & follows instructions  2.Patient has sufficient pre-bronchodilator inspiratory flow to be able to take a slow, deep inspiration and hold their breath f  Administration Instructions: Contains: 2.5 mG albuterol and 0.5 mG ipratropium  This is a Look-alike/Sound-alike Medication  Provider's Contact #: [phone] (10-25 @ 13:23)  ALBUTerol    90 MICROgram(s) HFA Inhaler: Known as PROVENTIL HFA  1 Puff(s), Inhalation via Spacer, every 4 hours  Special Instructions: CONDITIONAL ORDER: ACTIVATE MDI order once respiratory assessment determines that patient education and treatment can be initiated and converted from small volume nebulizer to MDI per [facility] protocol. Discontinue nebulizer order once patient has converted to MDI.  Below is the criteria that must be met to make the conversion:  1.Patient is cooperative, alert, oriented & follows instructions  2.Patient has sufficient pre-bronchodilator inspiratory flow to be able to take a slow  Provider's Contact #: [phone] (10-25 @ 13:23)  tiotropium 18 MICROgram(s) Capsule: Known as SPIRIVA  1 Capsule(s), Inhalation, daily  Special Instructions: CONDITIONAL ORDER: ACTIVATE MDI order once respiratory assessment determines that patient education and treatment can be initiated and converted from small volume nebulizer to MDI per [facility] [employer] protocol. Discontinue nebulizer order once patient has converted to MDI.  Below is the criteria that must be met to make the conversion:  1.Patient is cooperative, alert, oriented & follows instructions  2.Patient has sufficient pre-bronchodilator inspiratory flow to be able to take a slow  Provider's Contact #: [phone] (10-25 @ 13:23)  LORazepam     Tablet: [Ordered as ATIVAN]  1 milliGRAM(s), Oral, every 6 hours, PRN for Anxiety  Administration Instructions: This is a Look-alike/Sound-alike Medication  Provider's Contact #: [phone] (10-25 @ 15:14)  hydrOXYzine hydrochloride: [Known as ATARAX]  50 milliGRAM(s), Oral, at bedtime  Administration Instructions: This is a Look-alike/Sound-alike Medication  Provider's Contact #: [phone] (10-25 @ 15:14)  hydrOXYzine hydrochloride: [Known as ATARAX]  50 milliGRAM(s), Oral, at bedtime, PRN for Persistent Insomnia  Special Instructions: Please give PRN dose if scheduled dose not effective within 2 hours.  Administration Instructions: This is a Look-alike/Sound-alike Medication  Provider's Contact #: [phone] (10-25 @ 15:14)  haloperidol     Tablet: [Ordered as HALDOL]  5 milliGRAM(s), Oral, every 6 hours, PRN for Agitation  Special Instructions: Offer PO first.  If unable to accept PO, use IV.  If unable to accept IV, use IM.  Hold for QTC > 500.  Provider's Contact #: [phone] (10-25 @ 15:14)  haloperidol    Injectable: [Ordered as HALDOL Injectable]  5 milliGRAM(s), IV Push, every 6 hours, PRN for Agitation  Special Instructions: Offer PO first.  If unable to accept PO, use IV.  If unable to accept IV, use IM.  Hold for QTC > 500.  Provider's Contact #: [phone] (10-25 @ 15:14)  haloperidol    Injectable: [Ordered as HALDOL Injectable]  5 milliGRAM(s), IntraMuscular, every 6 hours, PRN for Agitation  Special Instructions: Offer PO first.  If unable to accept PO, use IV.  If unable to accept IV, use IM.  Hold for QTC > 500.  Provider's Contact #: [phone] (10-25 @ 15:14)      T(C): 36.7 (10-25-17 @ 15:26), Max: 36.7 (10-25-17 @ 06:03)  HR: 72 (10-25-17 @ 15:26) (63 - 80)  BP: 132/73 (10-25-17 @ 15:26) (120/75 - 132/73)  RR: 20 (10-25-17 @ 15:26) (18 - 20)  SpO2: 96% (10-25-17 @ 15:26) (95% - 97%)    CAPILLARY BLOOD GLUCOSE        I&O's Summary      PHYSICAL EXAM:  GENERAL: NAD  CHEST/LUNG: diffuse wheezing on expiration  HEART: Regular rate and rhythm; No murmurs, rubs, or gallops  ABDOMEN: Soft, Nontender, Nondistended; Bowel sounds present  EXTREMITIES:  No clubbing, cyanosis, or edema      LABS:                        13.2   13.54 )-----------( 175      ( 25 Oct 2017 05:30 )             41.0     10-25    142  |  108<H>  |  15  ----------------------------<  120<H>  5.1   |  23  |  0.83    Ca    8.5      25 Oct 2017 05:30    TPro  6.5  /  Alb  3.7  /  TBili  < 0.2<L>  /  DBili  < 0.1<L>  /  AST  15  /  ALT  15  /  AlkPhos  112  10-24              RADIOLOGY & ADDITIONAL TESTS:    Imaging Personally Reviewed:    Consultant(s) Notes Reviewed:      Care Discussed with Consultants/Other Providers:

## 2017-10-25 NOTE — BEHAVIORAL HEALTH ASSESSMENT NOTE - SUMMARY
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  Pt has been anxious in hospital.  Psych consulted for anxiety.    On exam, pt AAOx4, cooperative with interview.  Reports increased anxiety in hospital setting and on treatment with prednisone.  Reports stable mood prior to hospitalization.  C/w Prozac 40mg daily, Klonopin 1.5mg BID, Lamictal 100mg BID.  Pt reports difficulty sleeping despite Remeron (which was only prescribed for insomnia).  Discontinue Remeron and start hydroxyzine 50mg qHS.  PRNs as written below.  F/U EKG.  Will follow.  Continue psychiatric f/u at [facility] ([phone]) upon discharge. 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  Pt has been anxious in hospital.  Psych consulted for anxiety.    On exam, pt AAOx4, cooperative with interview.  Reports increased anxiety in hospital setting and on treatment with prednisone.  Reports stable mood prior to hospitalization.  C/w Prozac 40mg daily, Klonopin 1.5mg BID, Lamictal 100mg BID.  Pt reports difficulty sleeping despite Remeron (which was only prescribed for insomnia).  Discontinue Remeron and start hydroxyzine 50mg qHS.  PRNs as written below.  F/U EKG.  Will closely monitor for mood changes during prednisone treatment as pt with increased risk of medication-induced jb given hx of bipolar.  Will follow.  Continue psychiatric f/u at [facility] ([phone]) upon discharge.

## 2017-10-25 NOTE — BEHAVIORAL HEALTH ASSESSMENT NOTE - NSBHCHARTREVIEWVS_PSY_A_CORE FT
Vital Signs Last 24 Hrs  T(C): 36.7 (25 Oct 2017 15:26), Max: 36.7 (25 Oct 2017 06:03)  T(F): 98.1 (25 Oct 2017 15:26), Max: 98.1 (25 Oct 2017 15:26)  HR: 72 (25 Oct 2017 15:26) (63 - 80)  BP: 132/73 (25 Oct 2017 15:26) (120/75 - 132/73)  BP(mean): --  RR: 20 (25 Oct 2017 15:26) (18 - 20)  SpO2: 96% (25 Oct 2017 15:26) (95% - 97%)

## 2017-10-25 NOTE — PROGRESS NOTE ADULT - PROBLEM SELECTOR PLAN 1
-  - - pt is feeling better overall, she has been using flonase which has been helping with the sinus congestion and cough  - exam sounds improved today and peak flows are improving   - would continue with solumedrol 20 mg IV q8 for today   - continue w/ azithromycin x 5 days total  - continue w/ duonebs q4, check peak flows q6 hours, symbicort  - continue w/ flonase bid and claritin  - cough suppression w/ robitussin

## 2017-10-25 NOTE — BEHAVIORAL HEALTH ASSESSMENT NOTE - CASE SUMMARY
Agree with above. Please see today's progress note (10/26) for updated assessment/plan. Will follow.

## 2017-10-26 ENCOUNTER — TRANSCRIPTION ENCOUNTER (OUTPATIENT)
Age: 42
End: 2017-10-26

## 2017-10-26 VITALS
TEMPERATURE: 98 F | OXYGEN SATURATION: 98 % | HEART RATE: 85 BPM | SYSTOLIC BLOOD PRESSURE: 125 MMHG | DIASTOLIC BLOOD PRESSURE: 71 MMHG | RESPIRATION RATE: 17 BRPM

## 2017-10-26 LAB
ALBUMIN SERPL ELPH-MCNC: 4 G/DL — SIGNIFICANT CHANGE UP (ref 3.3–5)
ALP SERPL-CCNC: 105 U/L — SIGNIFICANT CHANGE UP (ref 40–120)
ALT FLD-CCNC: 20 U/L — SIGNIFICANT CHANGE UP (ref 4–33)
AST SERPL-CCNC: 13 U/L — SIGNIFICANT CHANGE UP (ref 4–32)
BASOPHILS # BLD AUTO: 0.01 K/UL — SIGNIFICANT CHANGE UP (ref 0–0.2)
BASOPHILS NFR BLD AUTO: 0.1 % — SIGNIFICANT CHANGE UP (ref 0–2)
BILIRUB SERPL-MCNC: < 0.2 MG/DL — LOW (ref 0.2–1.2)
BUN SERPL-MCNC: 14 MG/DL — SIGNIFICANT CHANGE UP (ref 7–23)
CALCIUM SERPL-MCNC: 8.9 MG/DL — SIGNIFICANT CHANGE UP (ref 8.4–10.5)
CHLORIDE SERPL-SCNC: 104 MMOL/L — SIGNIFICANT CHANGE UP (ref 98–107)
CO2 SERPL-SCNC: 27 MMOL/L — SIGNIFICANT CHANGE UP (ref 22–31)
CREAT SERPL-MCNC: 0.92 MG/DL — SIGNIFICANT CHANGE UP (ref 0.5–1.3)
EOSINOPHIL # BLD AUTO: 0 K/UL — SIGNIFICANT CHANGE UP (ref 0–0.5)
EOSINOPHIL NFR BLD AUTO: 0 % — SIGNIFICANT CHANGE UP (ref 0–6)
GLUCOSE SERPL-MCNC: 109 MG/DL — HIGH (ref 70–99)
HCT VFR BLD CALC: 40.3 % — SIGNIFICANT CHANGE UP (ref 34.5–45)
HGB BLD-MCNC: 13.2 G/DL — SIGNIFICANT CHANGE UP (ref 11.5–15.5)
IMM GRANULOCYTES # BLD AUTO: 0.24 # — SIGNIFICANT CHANGE UP
IMM GRANULOCYTES NFR BLD AUTO: 2 % — HIGH (ref 0–1.5)
LYMPHOCYTES # BLD AUTO: 2.93 K/UL — SIGNIFICANT CHANGE UP (ref 1–3.3)
LYMPHOCYTES # BLD AUTO: 25 % — SIGNIFICANT CHANGE UP (ref 13–44)
MAGNESIUM SERPL-MCNC: 2.1 MG/DL — SIGNIFICANT CHANGE UP (ref 1.6–2.6)
MCHC RBC-ENTMCNC: 30.5 PG — SIGNIFICANT CHANGE UP (ref 27–34)
MCHC RBC-ENTMCNC: 32.8 % — SIGNIFICANT CHANGE UP (ref 32–36)
MCV RBC AUTO: 93.1 FL — SIGNIFICANT CHANGE UP (ref 80–100)
MONOCYTES # BLD AUTO: 0.73 K/UL — SIGNIFICANT CHANGE UP (ref 0–0.9)
MONOCYTES NFR BLD AUTO: 6.2 % — SIGNIFICANT CHANGE UP (ref 2–14)
NEUTROPHILS # BLD AUTO: 7.82 K/UL — HIGH (ref 1.8–7.4)
NEUTROPHILS NFR BLD AUTO: 66.7 % — SIGNIFICANT CHANGE UP (ref 43–77)
NRBC # FLD: 0 — SIGNIFICANT CHANGE UP
PHOSPHATE SERPL-MCNC: 4.4 MG/DL — SIGNIFICANT CHANGE UP (ref 2.5–4.5)
PLATELET # BLD AUTO: 178 K/UL — SIGNIFICANT CHANGE UP (ref 150–400)
PMV BLD: 8.8 FL — SIGNIFICANT CHANGE UP (ref 7–13)
POTASSIUM SERPL-MCNC: 4.6 MMOL/L — SIGNIFICANT CHANGE UP (ref 3.5–5.3)
POTASSIUM SERPL-SCNC: 4.6 MMOL/L — SIGNIFICANT CHANGE UP (ref 3.5–5.3)
PROT SERPL-MCNC: 6.5 G/DL — SIGNIFICANT CHANGE UP (ref 6–8.3)
RBC # BLD: 4.33 M/UL — SIGNIFICANT CHANGE UP (ref 3.8–5.2)
RBC # FLD: 13.3 % — SIGNIFICANT CHANGE UP (ref 10.3–14.5)
SODIUM SERPL-SCNC: 143 MMOL/L — SIGNIFICANT CHANGE UP (ref 135–145)
WBC # BLD: 11.73 K/UL — HIGH (ref 3.8–10.5)
WBC # FLD AUTO: 11.73 K/UL — HIGH (ref 3.8–10.5)

## 2017-10-26 PROCEDURE — 99239 HOSP IP/OBS DSCHRG MGMT >30: CPT

## 2017-10-26 PROCEDURE — 99233 SBSQ HOSP IP/OBS HIGH 50: CPT

## 2017-10-26 PROCEDURE — 99233 SBSQ HOSP IP/OBS HIGH 50: CPT | Mod: GC

## 2017-10-26 RX ORDER — LORATADINE 10 MG/1
1 TABLET ORAL
Qty: 0 | Refills: 0 | COMMUNITY
Start: 2017-10-26

## 2017-10-26 RX ORDER — BUDESONIDE AND FORMOTEROL FUMARATE DIHYDRATE 160; 4.5 UG/1; UG/1
2 AEROSOL RESPIRATORY (INHALATION)
Qty: 2 | Refills: 0 | OUTPATIENT
Start: 2017-10-26 | End: 2017-11-25

## 2017-10-26 RX ORDER — BUDESONIDE AND FORMOTEROL FUMARATE DIHYDRATE 160; 4.5 UG/1; UG/1
1 AEROSOL RESPIRATORY (INHALATION)
Qty: 0 | Refills: 0 | COMMUNITY
Start: 2017-10-26

## 2017-10-26 RX ORDER — AZITHROMYCIN 500 MG/1
1 TABLET, FILM COATED ORAL
Qty: 1 | Refills: 0 | OUTPATIENT
Start: 2017-10-26 | End: 2017-10-27

## 2017-10-26 RX ORDER — IPRATROPIUM/ALBUTEROL SULFATE 18-103MCG
3 AEROSOL WITH ADAPTER (GRAM) INHALATION
Qty: 360 | Refills: 0 | OUTPATIENT
Start: 2017-10-26

## 2017-10-26 RX ORDER — TIOTROPIUM BROMIDE 18 UG/1
1 CAPSULE ORAL; RESPIRATORY (INHALATION)
Qty: 0 | Refills: 0 | COMMUNITY
Start: 2017-10-26

## 2017-10-26 RX ORDER — FLUTICASONE PROPIONATE 50 MCG
1 SPRAY, SUSPENSION NASAL
Qty: 0 | Refills: 0 | COMMUNITY
Start: 2017-10-26

## 2017-10-26 RX ORDER — AZITHROMYCIN 500 MG/1
500 TABLET, FILM COATED ORAL DAILY
Qty: 0 | Refills: 0 | Status: DISCONTINUED | OUTPATIENT
Start: 2017-10-27 | End: 2017-10-26

## 2017-10-26 RX ADMIN — Medication 3 MILLILITER(S): at 08:14

## 2017-10-26 RX ADMIN — Medication 100 MILLIGRAM(S): at 11:04

## 2017-10-26 RX ADMIN — BUDESONIDE AND FORMOTEROL FUMARATE DIHYDRATE 2 PUFF(S): 160; 4.5 AEROSOL RESPIRATORY (INHALATION) at 08:14

## 2017-10-26 RX ADMIN — AZITHROMYCIN 250 MILLIGRAM(S): 500 TABLET, FILM COATED ORAL at 11:04

## 2017-10-26 RX ADMIN — Medication 1 SPRAY(S): at 06:49

## 2017-10-26 RX ADMIN — Medication 3 MILLILITER(S): at 00:44

## 2017-10-26 RX ADMIN — Medication 50 MILLIGRAM(S): at 00:01

## 2017-10-26 RX ADMIN — LAMOTRIGINE 100 MILLIGRAM(S): 25 TABLET, ORALLY DISINTEGRATING ORAL at 06:53

## 2017-10-26 RX ADMIN — Medication 1.5 MILLIGRAM(S): at 06:53

## 2017-10-26 RX ADMIN — Medication 40 MILLIGRAM(S): at 11:03

## 2017-10-26 RX ADMIN — LORATADINE 10 MILLIGRAM(S): 10 TABLET ORAL at 11:04

## 2017-10-26 RX ADMIN — Medication 3 MILLILITER(S): at 04:09

## 2017-10-26 RX ADMIN — Medication 3 MILLILITER(S): at 13:36

## 2017-10-26 RX ADMIN — Medication 20 MILLIGRAM(S): at 07:00

## 2017-10-26 NOTE — PROGRESS NOTE BEHAVIORAL HEALTH - CASE SUMMARY
Pt seen/evaluated; pt is a 42 year-old woman with bipolar disorder, DM, asthma/COPD, admitted for an asthma exacerbation. Pt seen last night for anxiety. On today's exam pt is more focused on her insomnia than her anxiety. We discuss possible agents to help with her sleep and she agrees to try Seroquel. Pt denies any mood or psychotic sx at this time, no SI/HI, has outpt psychiatrist and therapist she follows with, and is A&Ox3. Impression: bipolar I disorder. Plan: as above- continue current psych meds, discontinue hydroxyzine, add Seroquel 50mg po qhs prn.  Pt is psychiatrically stable for discharge home, once medically stable. Will follow prn.

## 2017-10-26 NOTE — PROGRESS NOTE ADULT - ASSESSMENT
41 yo F w/ obesity, bipolar disorder, long-term and current smoking, and likely asthma who presented w/ chest tightness and shortness of breath consistent with an asthma exacerbation in the setting of entero/rhino virus positive.
41 yo f with bipolar d/o on meds/stable, asthma, obesity, diet controlled DM, here w/ asthma exacerbation
43 yo f with bipolar d/o on meds/stable, asthma, obesity, diet controlled DM, here w/ asthma exacerbation
43 yo F w/ obesity, bipolar disorder, long-term and current smoking, possible COPD, and likely asthma who presented w/ chest tightness and shortness of breath consistent with an asthma exacerbation in the setting of entero/rhino virus positive. Breathing improved as inpatient on bronchodilators, systemic steroids IV, fluticasone nasal spray, loratadine PO.
41 yo f with bipolar d/o on meds/stable, asthma, obesity, diet controlled DM, here w/ asthma exacerbation

## 2017-10-26 NOTE — PROGRESS NOTE BEHAVIORAL HEALTH - NSBHCHARTREVIEWVS_PSY_A_CORE FT
ICU Vital Signs Last 24 Hrs  T(C): 36.6 (26 Oct 2017 06:12), Max: 36.7 (25 Oct 2017 15:26)  T(F): 97.9 (26 Oct 2017 06:12), Max: 98.1 (25 Oct 2017 15:26)  HR: 70 (26 Oct 2017 08:16) (70 - 92)  BP: 119/72 (26 Oct 2017 06:12) (119/72 - 132/73)  BP(mean): --  ABP: --  ABP(mean): --  RR: 17 (26 Oct 2017 06:12) (17 - 20)  SpO2: 96% (26 Oct 2017 08:16) (93% - 98%)

## 2017-10-26 NOTE — PROGRESS NOTE ADULT - SUBJECTIVE AND OBJECTIVE BOX
CHIEF COMPLAINT:    Interval Events:    REVIEW OF SYSTEMS:  Constitutional: No fevers or chills. No weight loss. No fatigue or generalised malaise.  Eyes: No itching or discharge from the eyes  ENT: No ear pain. No ear discharge. No nasal congestion. No post nasal drip. No epistaxis. No throat pain. No sore throat. No difficulty swallowing.   CV: No chest pain. No palpitations. No lightheadedness or dizziness.   Resp: No dyspnea at rest. No dyspnea on exertion. No orthopnea. No wheezing. No cough. No stridor. No sputum production. No chest pain with respiration.  GI: No nausea. No vomiting. No diarrhea.  MSK: No joint pain or pain in any extremities  Integumentary: No skin lesions. No pedal edema.  Neurological: No gross motor weakness. No sensory changes.  [ ] All other systems negative  [ ] Unable to assess ROS because ________    OBJECTIVE:  ICU Vital Signs Last 24 Hrs  T(C): 36.6 (26 Oct 2017 06:12), Max: 36.7 (25 Oct 2017 15:26)  T(F): 97.9 (26 Oct 2017 06:12), Max: 98.1 (25 Oct 2017 15:26)  HR: 70 (26 Oct 2017 08:16) (70 - 92)  BP: 119/72 (26 Oct 2017 06:12) (119/72 - 132/73)  BP(mean): --  ABP: --  ABP(mean): --  RR: 17 (26 Oct 2017 06:12) (17 - 20)  SpO2: 96% (26 Oct 2017 08:16) (93% - 98%)        CAPILLARY BLOOD GLUCOSE          PHYSICAL EXAM:  General: Awake, alert, oriented X 3.   HEENT: Atraumatic, normocephalic.                 Mallampatti Grade                 No nasal congestion.                No tonsillar or pharyngeal exudates.  Lymph Nodes: No palpable lymphadenopathy  Neck: No JVD. No carotid bruit.   Respiratory: Normal chest expansion                         Normal percussion                         Normal and equal air entry                         No wheeze, rhonchi or rales.  Cardiovascular: S1 S2 normal. No murmurs, rubs or gallops.   Abdomen: Soft, non-tender, non-distended. No organomegaly.  Extremities: Warm to touch. Peripheral pulse palpable. No pedal edema.   Skin: No rashes or skin lesions  Neurological: Motor and sensory examination equal and normal in all four extremities.  Psychiatry: Appropriate mood and affect.    HOSPITAL MEDICATIONS:  MEDICATIONS  (STANDING):  ALBUTerol    90 MICROgram(s) HFA Inhaler 1 Puff(s) Inhalation every 4 hours  ALBUTerol/ipratropium for Nebulization 3 milliLiter(s) Nebulizer every 4 hours  azithromycin  IVPB 500 milliGRAM(s) IV Intermittent every 24 hours  buDESOnide 160 MICROgram(s)/formoterol 4.5 MICROgram(s) Inhaler 2 Puff(s) Inhalation two times a day  clonazePAM Tablet 1.5 milliGRAM(s) Oral two times a day  docusate sodium 100 milliGRAM(s) Oral daily  enoxaparin Injectable 40 milliGRAM(s) SubCutaneous every 24 hours  FLUoxetine 40 milliGRAM(s) Oral daily  fluticasone propionate 50 MICROgram(s)/spray Nasal Spray 1 Spray(s) Both Nostrils two times a day  hydrOXYzine hydrochloride 50 milliGRAM(s) Oral at bedtime  influenza   Vaccine 0.5 milliLiter(s) IntraMuscular once  lamoTRIgine 100 milliGRAM(s) Oral two times a day  loratadine 10 milliGRAM(s) Oral daily  methylPREDNISolone sodium succinate Injectable 20 milliGRAM(s) IV Push every 8 hours  senna 2 Tablet(s) Oral at bedtime  tiotropium 18 MICROgram(s) Capsule 1 Capsule(s) Inhalation daily    MEDICATIONS  (PRN):  ALBUTerol/ipratropium for Nebulization 3 milliLiter(s) Nebulizer every 2 hours PRN sob/wheezing  guaiFENesin    Syrup 200 milliGRAM(s) Oral every 6 hours PRN Cough  haloperidol     Tablet 5 milliGRAM(s) Oral every 6 hours PRN Agitation  haloperidol    Injectable 5 milliGRAM(s) IV Push every 6 hours PRN Agitation  haloperidol    Injectable 5 milliGRAM(s) IntraMuscular every 6 hours PRN Agitation  hydrOXYzine hydrochloride 50 milliGRAM(s) Oral at bedtime PRN Persistent Insomnia  LORazepam     Tablet 1 milliGRAM(s) Oral every 6 hours PRN Anxiety      LABS:                        13.2   11.73 )-----------( 178      ( 26 Oct 2017 06:00 )             40.3     10-26    143  |  104  |  14  ----------------------------<  109<H>  4.6   |  27  |  0.92    Ca    8.9      26 Oct 2017 06:00  Phos  4.4     10-26  Mg     2.1     10-26    TPro  6.5  /  Alb  4.0  /  TBili  < 0.2<L>  /  DBili  x   /  AST  13  /  ALT  20  /  AlkPhos  105  10-26              MICROBIOLOGY:     RADIOLOGY:  [ ] Reviewed and interpreted by me    Point of Care Ultrasound Findings:    PFT:    EKG: CHIEF COMPLAINT: Coughing and dyspnea    Interval Events:  Did not sleep well overnight. Breathing better than on admission.   Peak flows ~250    REVIEW OF SYSTEMS:  Constitutional: Fatigue, generalized malaise. No fever.   ENT: Frontal headache, similar to headaches that she gets at home   CV: Pleuritic chest pain, no other chest pain.   Resp: No dyspnea at rest. Some coughing and dyspnea on exertion, improved from admission.   Integumentary:  No pedal edema.    [ ] All other systems negative  [ ] Unable to assess ROS because ________    OBJECTIVE:  ICU Vital Signs Last 24 Hrs  T(C): 36.6 (26 Oct 2017 06:12), Max: 36.7 (25 Oct 2017 15:26)  T(F): 97.9 (26 Oct 2017 06:12), Max: 98.1 (25 Oct 2017 15:26)  HR: 70 (26 Oct 2017 08:16) (70 - 92)  BP: 119/72 (26 Oct 2017 06:12) (119/72 - 132/73)  RR: 17 (26 Oct 2017 06:12) (17 - 20)  SpO2: 96% (26 Oct 2017 08:16) (93% - 98%)    CAPILLARY BLOOD GLUCOSE    PHYSICAL EXAM:  General: Awake, alert, oriented X 3.   Respiratory: Some wheezes, dramatically improved from two days ago.   Cardiovascular: RRR  Abdomen: Soft, non-tender, obese  Extremities: Warm to touch.   Neurological: Moves all four extremities.  Psychiatry: Sad mood, blunted affect.    HOSPITAL MEDICATIONS:  MEDICATIONS  (STANDING):  ALBUTerol    90 MICROgram(s) HFA Inhaler 1 Puff(s) Inhalation every 4 hours  ALBUTerol/ipratropium for Nebulization 3 milliLiter(s) Nebulizer every 4 hours  azithromycin  IVPB 500 milliGRAM(s) IV Intermittent every 24 hours  buDESOnide 160 MICROgram(s)/formoterol 4.5 MICROgram(s) Inhaler 2 Puff(s) Inhalation two times a day  clonazePAM Tablet 1.5 milliGRAM(s) Oral two times a day  docusate sodium 100 milliGRAM(s) Oral daily  enoxaparin Injectable 40 milliGRAM(s) SubCutaneous every 24 hours  FLUoxetine 40 milliGRAM(s) Oral daily  fluticasone propionate 50 MICROgram(s)/spray Nasal Spray 1 Spray(s) Both Nostrils two times a day  hydrOXYzine hydrochloride 50 milliGRAM(s) Oral at bedtime  influenza   Vaccine 0.5 milliLiter(s) IntraMuscular once  lamoTRIgine 100 milliGRAM(s) Oral two times a day  loratadine 10 milliGRAM(s) Oral daily  methylPREDNISolone sodium succinate Injectable 20 milliGRAM(s) IV Push every 8 hours  senna 2 Tablet(s) Oral at bedtime  tiotropium 18 MICROgram(s) Capsule 1 Capsule(s) Inhalation daily    MEDICATIONS  (PRN):  ALBUTerol/ipratropium for Nebulization 3 milliLiter(s) Nebulizer every 2 hours PRN sob/wheezing  guaiFENesin    Syrup 200 milliGRAM(s) Oral every 6 hours PRN Cough  haloperidol     Tablet 5 milliGRAM(s) Oral every 6 hours PRN Agitation  haloperidol    Injectable 5 milliGRAM(s) IV Push every 6 hours PRN Agitation  haloperidol    Injectable 5 milliGRAM(s) IntraMuscular every 6 hours PRN Agitation  hydrOXYzine hydrochloride 50 milliGRAM(s) Oral at bedtime PRN Persistent Insomnia  LORazepam     Tablet 1 milliGRAM(s) Oral every 6 hours PRN Anxiety      LABS:                        13.2   11.73 )-----------( 178      ( 26 Oct 2017 06:00 )             40.3     10-26    143  |  104  |  14  ----------------------------<  109<H>  4.6   |  27  |  0.92    Ca    8.9      26 Oct 2017 06:00  Phos  4.4     10-26  Mg     2.1     10-26    TPro  6.5  /  Alb  4.0  /  TBili  < 0.2<L>  /  DBili  x   /  AST  13  /  ALT  20  /  AlkPhos  105  10-26      MICROBIOLOGY:   Rapid RVP 10/24/2017 -- Entero/Rhinovirus positive    RADIOLOGY:  [ ] Reviewed and interpreted by me    Point of Care Ultrasound Findings:    PFT:    EKG:

## 2017-10-26 NOTE — DISCHARGE NOTE ADULT - CARE PLAN
Principal Discharge DX:	Moderate asthma with exacerbation, unspecified whether persistent  Goal:	complete prednisone taper  Instructions for follow-up, activity and diet:	you have a pulmonary appointment on Nov 8th at 2:45pm at [address] [facility] [address] 107  Secondary Diagnosis:	Bipolar 1 disorder Principal Discharge DX:	Moderate asthma with exacerbation, unspecified whether persistent  Goal:	complete prednisone taper  Instructions for follow-up, activity and diet:	you have a pulmonary appointment on Nov 8th at 2:45pm at [address] [facility] 107  Call   Secondary Diagnosis:	Bipolar 1 disorder  Goal:	free from symptom  Instructions for follow-up, activity and diet:	Continue all medication. F/U with Psychiatrist within 2 week.

## 2017-10-26 NOTE — PROGRESS NOTE ADULT - SUBJECTIVE AND OBJECTIVE BOX
Patient is a 42y old  Female who presents with a chief complaint of sob/cough (26 Oct 2017 14:10)      SUBJECTIVE / OVERNIGHT EVENTS: pt seen at 12p- felt much better- wants to leave- less chest tightness    MEDICATIONS  (STANDING):  ALBUTerol    90 MICROgram(s) HFA Inhaler 1 Puff(s) Inhalation every 4 hours  ALBUTerol/ipratropium for Nebulization 3 milliLiter(s) Nebulizer every 4 hours  azithromycin   Tablet 500 milliGRAM(s) Oral daily  buDESOnide 160 MICROgram(s)/formoterol 4.5 MICROgram(s) Inhaler 2 Puff(s) Inhalation two times a day  clonazePAM Tablet 1.5 milliGRAM(s) Oral two times a day  docusate sodium 100 milliGRAM(s) Oral daily  enoxaparin Injectable 40 milliGRAM(s) SubCutaneous every 24 hours  FLUoxetine 40 milliGRAM(s) Oral daily  fluticasone propionate 50 MICROgram(s)/spray Nasal Spray 1 Spray(s) Both Nostrils two times a day  hydrOXYzine hydrochloride 50 milliGRAM(s) Oral at bedtime  influenza   Vaccine 0.5 milliLiter(s) IntraMuscular once  lamoTRIgine 100 milliGRAM(s) Oral two times a day  loratadine 10 milliGRAM(s) Oral daily  predniSONE   Tablet 40 milliGRAM(s) Oral daily  senna 2 Tablet(s) Oral at bedtime  tiotropium 18 MICROgram(s) Capsule 1 Capsule(s) Inhalation daily    MEDICATIONS  (PRN):  ALBUTerol/ipratropium for Nebulization 3 milliLiter(s) Nebulizer every 2 hours PRN sob/wheezing  guaiFENesin    Syrup 200 milliGRAM(s) Oral every 6 hours PRN Cough  haloperidol     Tablet 5 milliGRAM(s) Oral every 6 hours PRN Agitation  haloperidol    Injectable 5 milliGRAM(s) IV Push every 6 hours PRN Agitation  haloperidol    Injectable 5 milliGRAM(s) IntraMuscular every 6 hours PRN Agitation  hydrOXYzine hydrochloride 50 milliGRAM(s) Oral at bedtime PRN Persistent Insomnia  LORazepam     Tablet 1 milliGRAM(s) Oral every 6 hours PRN Anxiety      Meds ordered within last 24hours  predniSONE   Tablet: [Known as DELTASONE]  40 milliGRAM(s), Oral, daily  Administration Instructions: This is a Look-alike/Sound-alike Medication  Provider's Contact #: [phone] (10-26 @ 12:38)  azithromycin   Tablet: [Ordered as ZITHROMAX]  500 milliGRAM(s), Oral, daily  Indication: Bronchitis  Provider's Contact #: [phone] (10-26 @ 12:38)      T(C): 36.6 (10-26-17 @ 14:33), Max: 36.7 (10-25-17 @ 20:30)  HR: 85 (10-26-17 @ 14:33) (70 - 92)  BP: 125/71 (10-26-17 @ 14:33) (119/72 - 128/80)  RR: 17 (10-26-17 @ 14:33) (17 - 18)  SpO2: 98% (10-26-17 @ 14:33) (93% - 98%)    CAPILLARY BLOOD GLUCOSE        I&O's Summary      PHYSICAL EXAM:  GENERAL: NAD  CHEST/LUNG: No wheezes  HEART: Regular rate and rhythm; No murmurs, rubs, or gallops  ABDOMEN: Soft, Nontender, Nondistended; Bowel sounds present  EXTREMITIES:  No clubbing, cyanosis, or edema      LABS:                        13.2   11.73 )-----------( 178      ( 26 Oct 2017 06:00 )             40.3     10-26    143  |  104  |  14  ----------------------------<  109<H>  4.6   |  27  |  0.92    Ca    8.9      26 Oct 2017 06:00  Phos  4.4     10-26  Mg     2.1     10-26    TPro  6.5  /  Alb  4.0  /  TBili  < 0.2<L>  /  DBili  x   /  AST  13  /  ALT  20  /  AlkPhos  105  10-26              RADIOLOGY & ADDITIONAL TESTS:    Imaging Personally Reviewed:    Consultant(s) Notes Reviewed:      Care Discussed with Consultants/Other Providers:

## 2017-10-26 NOTE — DISCHARGE NOTE ADULT - PLAN OF CARE
complete prednisone taper you have a pulmonary appointment on Nov 8th at 2:45pm at [address] [place] [address] rm [address] you have a pulmonary appointment on Nov 8th at 2:45pm at [address] [place] [address]  Call  free from symptom Continue all medication. F/U with Psychiatrist within 2 week.

## 2017-10-26 NOTE — DISCHARGE NOTE ADULT - PROVIDER TOKENS
FREE:[LAST:[Pulmonary clinic],PHONE:[(   )    -],FAX:[(   )    -]] FREE:[LAST:[Pulomonary Clinic],PHONE:[phone]],FAX:[(   )    -]]

## 2017-10-26 NOTE — DISCHARGE NOTE ADULT - PATIENT PORTAL LINK FT
“You can access the [facility] Patient Portal, offered by [facility], by registering with the following website: [email or web address]”

## 2017-10-26 NOTE — PROGRESS NOTE ADULT - PROBLEM SELECTOR PLAN 1
- would switch systemic steroids to prednisone 40mg PO (day 1 of 10-day taper)  - continue w/ azithromycin x 5 days total  - continue w/ albuterol/ipratropium q4, check peak flows q6 hours, budesonide/formoterol  - continue w/ fluticasone nasal spray BID and loratadine  - continue cough suppression w/ guaifenesin  - schedule outpatient pulmonology follow-up, ideally w [facility], at whichever site most convenient for patient, in 1-2 weeks  - outpatient sleep study, to be scheduled at pulm appointment  - outpatient inhaled steroid/LABA BID + rescue inhaler PRN  - outpatient fluticasone nasal spray   - outpatient loratadine  - outpatient peak-flow meter

## 2017-10-26 NOTE — DISCHARGE NOTE ADULT - CARE PROVIDER_API CALL
Pulmonary clinic,   Phone: (   )    -  Fax: (   )    - [facility],   Phone: [phone]  Fax: (   )    -

## 2017-10-26 NOTE — PROGRESS NOTE ADULT - ATTENDING COMMENTS
Pt seen and examined, agree with above. Clinically imporved, switch to Prednisone 40 mg daily today, taper over 10 days. Cont Duonebs, Symbicort BID, Flonase and Claritin. Needs oupt pulm FUP with PFTs and sleep study.
Pt seen  and examined, agree with above. Asthma exacerbation 2/2 enterorhino virus infection. Clinically improving on IV steroids and bronchodilators. Cont to track peak flow rates (improved to 250 s today). Keep solumedrol at current dose, cont Duonebs Q6H, flonase BID and Claritin daily. Will follow, if continues to improve can switch to Prednisone 40 mg daily tomorrow.

## 2017-10-26 NOTE — DISCHARGE NOTE ADULT - HOSPITAL COURSE
43 yo f with bipolar d/o on meds/stable, asthma, obesity, diet controlled DM, here w/ asthma exacerbation    Moderate asthma with exacerbation, unspecified whether persistent. seen by pulm as well due to persistent wheezing-improved on d/c.  IV solumedrol changed to PO prednisone for 10day taper on discharge  nebs standing  symbicort  claritin  flonase    Bipolar affective disorder, remission status unspecified- seen by psych- home meds continued

## 2017-10-26 NOTE — DISCHARGE NOTE ADULT - MEDICATION SUMMARY - MEDICATIONS TO TAKE
I will START or STAY ON the medications listed below when I get home from the hospital:    predniSONE  -- 40 milligram(s) by mouth once a day for 2 days, 30mg by mouth daily for 2 days, 20mg by mouth daily for 2 days, 10mg by mouth daily for 2 days, 5mg by mouth daily for 2 days then stop  -- Indication: For Asthma    clonazePAM 0.5 mg oral tablet  -- 3 tab(s) by mouth 2 times a day  -- Indication: For Bipolar affective disorder, remission status unspecified    lamoTRIgine 100 mg oral tablet  -- 1 tab(s) by mouth 2 times a day  -- Indication: For Bipolar affective disorder, remission status unspecified    FLUoxetine 40 mg oral capsule  -- 1 cap(s) by mouth once a day  -- Indication: For Bipolar affective disorder, remission status unspecified    mirtazapine 15 mg oral tablet  -- 1 tab(s) by mouth once a day (at bedtime)  -- Indication: For Bipolar affective disorder, remission status unspecified    loratadine 10 mg oral tablet  -- 1 tab(s) by mouth once a day  -- Indication: For Asthma    budesonide-formoterol 160 mcg-4.5 mcg/inh inhalation aerosol  -- 1 puff(s) inhaled 2 times a day  -- Indication: For Asthma    tiotropium 18 mcg inhalation capsule  -- 1 cap(s) inhaled once a day  -- Indication: For COPD exacerbation    budesonide-formoterol 160 mcg-4.5 mcg/inh inhalation aerosol  -- 2 puff(s) inhaled 2 times a day   -- Indication: For Asthma    ipratropium-albuterol 0.5 mg-2.5 mg/3 mLinhalation solution  -- 3 milliliter(s) inhaled every 4 hours  -- Indication: For Asthma    albuterol 90 mcg/inh inhalation aerosol  -- 2 puff(s) inhaled 4 times a day, As Needed  -- Indication: For COPD exacerbation    guaiFENesin 100 mg/5 mL oral liquid  -- 10 milliliter(s) by mouth every 6 hours, As needed, Cough  -- Indication: For Cough     azithromycin 500 mg oral tablet  -- 1 tab(s) by mouth once a day  -- Indication: For COPD exacerbation    fluticasone 50 mcg/inh nasal spray  -- 1 spray(s) into nose 2 times a day  -- Indication: For Nasal congestion I will START or STAY ON the medications listed below when I get home from the hospital:    predniSONE  -- 40 milligram(s) by mouth once a day for 2 days, 30mg by mouth daily for 2 days, 20mg by mouth daily for 2 days, 10mg by mouth daily for 2 days, 5mg by mouth daily for 2 days then stop  -- Indication: For Asthma    clonazePAM 0.5 mg oral tablet  -- 3 tab(s) by mouth 2 times a day  -- Indication: For Bipolar affective disorder, remission status unspecified    lamoTRIgine 100 mg oral tablet  -- 1 tab(s) by mouth 2 times a day  -- Indication: For Bipolar affective disorder, remission status unspecified    FLUoxetine 40 mg oral capsule  -- 1 cap(s) by mouth once a day  -- Indication: For Bipolar affective disorder, remission status unspecified    mirtazapine 15 mg oral tablet  -- 1 tab(s) by mouth once a day (at bedtime)  -- Indication: For Bipolar affective disorder, remission status unspecified    loratadine 10 mg oral tablet  -- 1 tab(s) by mouth once a day  -- Indication: For Asthma    tiotropium 18 mcg inhalation capsule  -- 1 cap(s) inhaled once a day  -- Indication: For COPD exacerbation    budesonide-formoterol 160 mcg-4.5 mcg/inh inhalation aerosol  -- 2 puff(s) inhaled 2 times a day   -- Indication: For Asthma    ipratropium-albuterol 0.5 mg-2.5 mg/3 mLinhalation solution  -- 3 milliliter(s) inhaled every 4 hours  -- Indication: For Asthma    albuterol 90 mcg/inh inhalation aerosol  -- 2 puff(s) inhaled 4 times a day, As Needed  -- Indication: For COPD exacerbation    guaiFENesin 100 mg/5 mL oral liquid  -- 10 milliliter(s) by mouth every 6 hours, As needed, Cough  -- Indication: For Cough     azithromycin 500 mg oral tablet  -- 1 tab(s) by mouth once a day  -- Indication: For COPD exacerbation    fluticasone 50 mcg/inh nasal spray  -- 1 spray(s) into nose 2 times a day  -- Indication: For Nasal congestion

## 2017-10-26 NOTE — PROGRESS NOTE ADULT - PROBLEM SELECTOR PROBLEM 1
Moderate asthma with exacerbation, unspecified whether persistent
Asthma
Asthma
Moderate asthma with exacerbation, unspecified whether persistent
Moderate asthma with exacerbation, unspecified whether persistent

## 2017-10-26 NOTE — PROGRESS NOTE BEHAVIORAL HEALTH - SUMMARY
42 year-old woman with bipolar disorder, DM, asthma/COPD, admitted for an asthma exacerbation. CL consulted for anxiety. Today patient says she is not anxious, but asks for help with her sleep. Recommend Seroquel 25 mg qhs. 42 year-old woman with bipolar disorder, DM, asthma/COPD, admitted for an asthma exacerbation. CL consulted for anxiety. Today patient says she is not anxious, but asks for help with her sleep. Recommend Seroquel 50 mg qhs.

## 2017-10-26 NOTE — PROGRESS NOTE BEHAVIORAL HEALTH - NSBHCHARTREVIEWLAB_PSY_A_CORE FT
CBC Full  -  ( 26 Oct 2017 06:00 )  WBC Count : 11.73 K/uL  Hemoglobin : 13.2 g/dL  Hematocrit : 40.3 %  Platelet Count - Automated : 178 K/uL  Mean Cell Volume : 93.1 fL  Mean Cell Hemoglobin : 30.5 pg  Mean Cell Hemoglobin Concentration : 32.8 %  Auto Neutrophil # : 7.82 K/uL  Auto Lymphocyte # : 2.93 K/uL  Auto Monocyte # : 0.73 K/uL  Auto Eosinophil # : 0.00 K/uL  Auto Basophil # : 0.01 K/uL  Auto Neutrophil % : 66.7 %  Auto Lymphocyte % : 25.0 %  Auto Monocyte % : 6.2 %  Auto Eosinophil % : 0.0 %  Auto Basophil % : 0.1 %  10-26    143  |  104  |  14  ----------------------------<  109<H>  4.6   |  27  |  0.92    Ca    8.9      26 Oct 2017 06:00  Phos  4.4     10-26  Mg     2.1     10-26    TPro  6.5  /  Alb  4.0  /  TBili  < 0.2<L>  /  DBili  x   /  AST  13  /  ALT  20  /  AlkPhos  105  10-26

## 2017-10-26 NOTE — PROGRESS NOTE ADULT - PROBLEM SELECTOR PROBLEM 2
Bipolar affective disorder, remission status unspecified

## 2017-10-26 NOTE — PROGRESS NOTE BEHAVIORAL HEALTH - NSBHFUPINTERVALHXFT_PSY_A_CORE
Patient seen and chart reviewed. No events overnight. Today patient denies anxiety. Says she has trouble sleeping, but is also receiving breathing treatments q4. States Atarax last night did not help her. States she's been on Trazodone in the past, up to 300 mg for insomina, which did not help. Reports also having been on Seroquel in the past, unsure of dose, but made her feel tired during the day and was stopped. Denies depressed mood or symptoms of psychosis. Patient seen and chart reviewed. No events overnight. Today patient denies anxiety. Says she has trouble sleeping, but is also receiving breathing treatments q4. States Atarax last night did not help her. States she's been on Trazodone in the past, up to 300 mg for insomnia, which did not help. Reports also having been on Seroquel in the past, unsure of dose, but made her feel tired during the day and was stopped. Denies depressed mood or symptoms of psychosis.

## 2017-11-08 ENCOUNTER — APPOINTMENT (OUTPATIENT)
Dept: PULMONOLOGY | Facility: CLINIC | Age: 42
End: 2017-11-08

## 2019-11-01 NOTE — ED PROVIDER NOTE - ENMT, MLM
Airway patent, Nasal mucosa mild hyperturbinate. Mouth with normal mucosa. Throat has no vesicles, no oropharyngeal exudates and uvula is midline.  bilateral frontal and maxillary sinus tenderness not applicable

## 2020-01-01 NOTE — PATIENT PROFILE ADULT. - SURGICAL SITE INCISION
Date of Birth: [birth year]	Time of Birth: 16:32     Admission Weight (g): 1480    Admission Date and Time:  20 @ 16:32         Gestational Age: 31.3     Source of admission [ x] Inborn     [ __ ]Transport from    [facility]: Baby is a 31.3 week GA di-di twin A male born to a 31 y/o  mother via vaginal delivery. Maternal history of beta thalassemia minor. Pregnancy complicated with admission for contractions on 3/24- s/p Mg, beta and amp, GDMA1. Presented with vaginal bleeding yesterday, and had elevation in Cr and LFTs with wnl blood pressure during the hospital course, concerning for atypical HELLP vs COVID-19. COVID testing pending at time of admission - ultimately negative. Maternal blood type O+. Prenatal labs negative/non reactive/immune GBS unknown, s/p amp x2. SROM 22h with light mec fluid. Briefly required CPAP 5 21%. Apgars 9/9.       Social History: No history of alcohol/tobacco exposure obtained  FHx: non-contributory to the condition being treated or details of FH documented here  ROS: unable to obtain ()     PHYSICAL EXAM:    General:	         Awake and active;   Head:		AFOF  Eyes:		Normally set bilaterally  Ears:		Patent bilaterally, no deformities  Nose/Mouth:	Nares patent, palate intact  Neck:		No masses, intact clavicles  Chest/Lungs:      Breath sounds equal to auscultation. No retractions, pectus  CV:		No murmurs appreciated, normal pulses bilaterally  Abdomen:          Soft nontender nondistended, no masses, bowel sounds present  :		Normal for gestational age  Back:		Intact skin, no sacral dimples or tags  Anus:		Grossly patent  Extremities:	FROM, no hip clicks  Skin:		Pink, no lesions  Neuro exam:	Appropriate tone, activity    **************************************************************************************************  Age:3d    LOS:3d    Vital Signs:  T(C): 36.9 ( @ 09:00), Max: 37.3 ( @ 21:00)  HR: 124 ( @ 09:00) (124 - 140)  BP: 59/45 ( @ 09:00) (55/36 - 59/45)  RR: 63 ( @ 09:00) (38 - 66)  SpO2: 100% ( @ 09:00) (98% - 100%)    hepatitis B IntraMuscular Vaccine - Peds 0.5 milliLiter(s) once  Parenteral Nutrition -  1 Each <Continuous>      LABS:         Blood type, Baby [] ABO: B  Rh; Positive DC; Negative                              19.8   8.59 )-----------( 287             [ @ 19:15]                  56.6  S 41.0%  B 0%  [place] 0%  Myelo 0%  Promyelo 0%  Blasts 0%  Lymph 42.0%  Mono 15.0%  Eos 2.0%  Baso 0%  Retic 0%        134  |103  | 38     ------------------<88   Ca 10.2 Mg 3.4  Ph 7.2   [ @ 02:40]  Test not performed SPECIMEN SEVERELY HEMOLYZED | 12   | 0.54        139  |105  | 36     ------------------<78   Ca 9.3  Mg 4.0  Ph 7.2   [ @ 02:00]  6.6   | 18   | 0.73               Bili T/D  [ @ 02:40] - 7.1/0.3, Bili T/D  [ @ 02:00] - 8.3/0.3, Bili T/D  [ @ 05:30] - 4.9/0.2   Tg []  78,  Tg []  112        POCT Glucose:    104    [02:38]                    VB-27 @ 20:06 7.35; 41; 85; 22; -2.8; 61.0                             **************************************************************************************************		  DISCHARGE PLANNING (date and status):  Hep B Vacc:  CCHD:			  :					  Hearing:    screen:	  Circumcision:  Hip US rec:  	  Synagis: 			  Other Immunizations (with dates):    		  Neurodevelop eval?	  CPR class done?  	  PVS at DC?  Vit D at DC?	  FE at DC?	    PMD:          Name:  ______________ _             Contact information:  ______________ _  Pharmacy: Name:  ______________ _              Contact information:  ______________ _    Follow-up appointments (list):      Time spent on the total subsequent encounter with >50% of the visit spent on counseling and/or coordination of care:[ _ ] 15 min[ _ ] 25 min[ _ ] 35 min  [ _ ] Discharge time spent >30 min   [ __ ] Car seat oximetry reviewed. no

## 2020-01-28 ENCOUNTER — EMERGENCY (EMERGENCY)
Facility: HOSPITAL | Age: 45
LOS: 1 days | Discharge: HOME | End: 2020-01-28
Attending: EMERGENCY MEDICINE
Payer: MEDICARE

## 2020-01-28 VITALS
DIASTOLIC BLOOD PRESSURE: 67 MMHG | HEART RATE: 82 BPM | TEMPERATURE: 98 F | RESPIRATION RATE: 18 BRPM | OXYGEN SATURATION: 94 % | SYSTOLIC BLOOD PRESSURE: 106 MMHG

## 2020-01-28 VITALS
TEMPERATURE: 98 F | HEIGHT: 63 IN | HEART RATE: 106 BPM | SYSTOLIC BLOOD PRESSURE: 148 MMHG | DIASTOLIC BLOOD PRESSURE: 84 MMHG | RESPIRATION RATE: 20 BRPM | WEIGHT: 293 LBS | OXYGEN SATURATION: 95 %

## 2020-01-28 LAB
ACETONE SERPL-MCNC: NEGATIVE — SIGNIFICANT CHANGE UP
ALBUMIN SERPL ELPH-MCNC: 3.8 G/DL — SIGNIFICANT CHANGE UP (ref 3.3–5.2)
ALP SERPL-CCNC: 180 U/L — HIGH (ref 40–120)
ALT FLD-CCNC: 38 U/L — HIGH
ANION GAP SERPL CALC-SCNC: 12 MMOL/L — SIGNIFICANT CHANGE UP (ref 5–17)
APPEARANCE UR: CLEAR — SIGNIFICANT CHANGE UP
APTT BLD: 32.5 SEC — SIGNIFICANT CHANGE UP (ref 27.5–36.3)
AST SERPL-CCNC: 26 U/L — SIGNIFICANT CHANGE UP
BACTERIA # UR AUTO: ABNORMAL
BILIRUB SERPL-MCNC: 0.3 MG/DL — LOW (ref 0.4–2)
BILIRUB UR-MCNC: NEGATIVE — SIGNIFICANT CHANGE UP
BUN SERPL-MCNC: 10 MG/DL — SIGNIFICANT CHANGE UP (ref 8–20)
CALCIUM SERPL-MCNC: 9.1 MG/DL — SIGNIFICANT CHANGE UP (ref 8.6–10.2)
CHLORIDE SERPL-SCNC: 94 MMOL/L — LOW (ref 98–107)
CO2 SERPL-SCNC: 26 MMOL/L — SIGNIFICANT CHANGE UP (ref 22–29)
COLOR SPEC: YELLOW — SIGNIFICANT CHANGE UP
CREAT SERPL-MCNC: 0.83 MG/DL — SIGNIFICANT CHANGE UP (ref 0.5–1.3)
DIFF PNL FLD: NEGATIVE — SIGNIFICANT CHANGE UP
EPI CELLS # UR: SIGNIFICANT CHANGE UP
GLUCOSE BLDC GLUCOMTR-MCNC: 295 MG/DL — HIGH (ref 70–99)
GLUCOSE SERPL-MCNC: 541 MG/DL — CRITICAL HIGH (ref 70–99)
GLUCOSE UR QL: 1000 MG/DL
HCT VFR BLD CALC: 45.9 % — HIGH (ref 34.5–45)
HGB BLD-MCNC: 15 G/DL — SIGNIFICANT CHANGE UP (ref 11.5–15.5)
INR BLD: 0.93 RATIO — SIGNIFICANT CHANGE UP (ref 0.88–1.16)
KETONES UR-MCNC: ABNORMAL
LACTATE BLDV-MCNC: 1.8 MMOL/L — SIGNIFICANT CHANGE UP (ref 0.5–2)
LEUKOCYTE ESTERASE UR-ACNC: NEGATIVE — SIGNIFICANT CHANGE UP
MCHC RBC-ENTMCNC: 27.8 PG — SIGNIFICANT CHANGE UP (ref 27–34)
MCHC RBC-ENTMCNC: 32.7 GM/DL — SIGNIFICANT CHANGE UP (ref 32–36)
MCV RBC AUTO: 85.2 FL — SIGNIFICANT CHANGE UP (ref 80–100)
NITRITE UR-MCNC: NEGATIVE — SIGNIFICANT CHANGE UP
NT-PROBNP SERPL-SCNC: 8 PG/ML — SIGNIFICANT CHANGE UP (ref 0–300)
OSMOLALITY SERPL: 334 MOSMOL/KG — HIGH (ref 275–300)
PH UR: 6 — SIGNIFICANT CHANGE UP (ref 5–8)
PLATELET # BLD AUTO: 158 K/UL — SIGNIFICANT CHANGE UP (ref 150–400)
POTASSIUM SERPL-MCNC: 4.3 MMOL/L — SIGNIFICANT CHANGE UP (ref 3.5–5.3)
POTASSIUM SERPL-SCNC: 4.3 MMOL/L — SIGNIFICANT CHANGE UP (ref 3.5–5.3)
PROT SERPL-MCNC: 6.6 G/DL — SIGNIFICANT CHANGE UP (ref 6.6–8.7)
PROT UR-MCNC: NEGATIVE MG/DL — SIGNIFICANT CHANGE UP
PROTHROM AB SERPL-ACNC: 10.7 SEC — SIGNIFICANT CHANGE UP (ref 10–12.9)
RBC # BLD: 5.39 M/UL — HIGH (ref 3.8–5.2)
RBC # FLD: 12.7 % — SIGNIFICANT CHANGE UP (ref 10.3–14.5)
RBC CASTS # UR COMP ASSIST: SIGNIFICANT CHANGE UP /HPF (ref 0–4)
SODIUM SERPL-SCNC: 132 MMOL/L — LOW (ref 135–145)
SP GR SPEC: 1.01 — SIGNIFICANT CHANGE UP (ref 1.01–1.02)
TROPONIN T SERPL-MCNC: <0.01 NG/ML — SIGNIFICANT CHANGE UP (ref 0–0.06)
UROBILINOGEN FLD QL: NEGATIVE MG/DL — SIGNIFICANT CHANGE UP
WBC # BLD: 6.71 K/UL — SIGNIFICANT CHANGE UP (ref 3.8–10.5)
WBC # FLD AUTO: 6.71 K/UL — SIGNIFICANT CHANGE UP (ref 3.8–10.5)
WBC UR QL: SIGNIFICANT CHANGE UP

## 2020-01-28 PROCEDURE — 85610 PROTHROMBIN TIME: CPT

## 2020-01-28 PROCEDURE — 84484 ASSAY OF TROPONIN QUANT: CPT

## 2020-01-28 PROCEDURE — 71045 X-RAY EXAM CHEST 1 VIEW: CPT

## 2020-01-28 PROCEDURE — 93005 ELECTROCARDIOGRAM TRACING: CPT

## 2020-01-28 PROCEDURE — G0378: CPT

## 2020-01-28 PROCEDURE — 85730 THROMBOPLASTIN TIME PARTIAL: CPT

## 2020-01-28 PROCEDURE — 71045 X-RAY EXAM CHEST 1 VIEW: CPT | Mod: 26

## 2020-01-28 PROCEDURE — 99284 EMERGENCY DEPT VISIT MOD MDM: CPT | Mod: 25

## 2020-01-28 PROCEDURE — 36415 COLL VENOUS BLD VENIPUNCTURE: CPT

## 2020-01-28 PROCEDURE — 87086 URINE CULTURE/COLONY COUNT: CPT

## 2020-01-28 PROCEDURE — 96374 THER/PROPH/DIAG INJ IV PUSH: CPT

## 2020-01-28 PROCEDURE — 99220: CPT

## 2020-01-28 PROCEDURE — 80053 COMPREHEN METABOLIC PANEL: CPT

## 2020-01-28 PROCEDURE — 83880 ASSAY OF NATRIURETIC PEPTIDE: CPT

## 2020-01-28 PROCEDURE — 85027 COMPLETE CBC AUTOMATED: CPT

## 2020-01-28 PROCEDURE — 82009 KETONE BODYS QUAL: CPT

## 2020-01-28 PROCEDURE — 82962 GLUCOSE BLOOD TEST: CPT

## 2020-01-28 PROCEDURE — 83605 ASSAY OF LACTIC ACID: CPT

## 2020-01-28 PROCEDURE — 94640 AIRWAY INHALATION TREATMENT: CPT

## 2020-01-28 PROCEDURE — 81001 URINALYSIS AUTO W/SCOPE: CPT

## 2020-01-28 PROCEDURE — 93010 ELECTROCARDIOGRAM REPORT: CPT

## 2020-01-28 PROCEDURE — 83930 ASSAY OF BLOOD OSMOLALITY: CPT

## 2020-01-28 RX ORDER — METFORMIN HYDROCHLORIDE 850 MG/1
1 TABLET ORAL
Qty: 60 | Refills: 0
Start: 2020-01-28 | End: 2020-02-26

## 2020-01-28 RX ORDER — IPRATROPIUM/ALBUTEROL SULFATE 18-103MCG
3 AEROSOL WITH ADAPTER (GRAM) INHALATION ONCE
Refills: 0 | Status: COMPLETED | OUTPATIENT
Start: 2020-01-28 | End: 2020-01-28

## 2020-01-28 RX ORDER — SODIUM CHLORIDE 9 MG/ML
1000 INJECTION INTRAMUSCULAR; INTRAVENOUS; SUBCUTANEOUS ONCE
Refills: 0 | Status: COMPLETED | OUTPATIENT
Start: 2020-01-28 | End: 2020-01-28

## 2020-01-28 RX ORDER — INSULIN HUMAN 100 [IU]/ML
8 INJECTION, SOLUTION SUBCUTANEOUS ONCE
Refills: 0 | Status: COMPLETED | OUTPATIENT
Start: 2020-01-28 | End: 2020-01-28

## 2020-01-28 RX ORDER — INSULIN LISPRO 100/ML
4 VIAL (ML) SUBCUTANEOUS ONCE
Refills: 0 | Status: COMPLETED | OUTPATIENT
Start: 2020-01-28 | End: 2020-01-28

## 2020-01-28 RX ORDER — SODIUM CHLORIDE 9 MG/ML
1000 INJECTION INTRAMUSCULAR; INTRAVENOUS; SUBCUTANEOUS
Refills: 0 | Status: COMPLETED | OUTPATIENT
Start: 2020-01-28 | End: 2020-01-28

## 2020-01-28 RX ADMIN — SODIUM CHLORIDE 1000 MILLILITER(S): 9 INJECTION INTRAMUSCULAR; INTRAVENOUS; SUBCUTANEOUS at 18:39

## 2020-01-28 RX ADMIN — INSULIN HUMAN 8 UNIT(S): 100 INJECTION, SOLUTION SUBCUTANEOUS at 20:31

## 2020-01-28 RX ADMIN — SODIUM CHLORIDE 1000 MILLILITER(S): 9 INJECTION INTRAMUSCULAR; INTRAVENOUS; SUBCUTANEOUS at 18:37

## 2020-01-28 RX ADMIN — Medication 3 MILLILITER(S): at 18:41

## 2020-01-28 RX ADMIN — SODIUM CHLORIDE 1000 MILLILITER(S): 9 INJECTION INTRAMUSCULAR; INTRAVENOUS; SUBCUTANEOUS at 22:25

## 2020-01-28 NOTE — ED CDU PROVIDER INITIAL DAY NOTE - FAMILY HISTORY
Mother  Still living? Unknown  Family history of diabetes mellitus (DM), Age at diagnosis: Age Unknown     Sibling  Still living? Yes, Estimated age: 51-60  Family history of diabetes mellitus (DM), Age at diagnosis: Age Unknown

## 2020-01-28 NOTE — ED ADULT NURSE NOTE - CHIEF COMPLAINT QUOTE
Patient arrived to ED today with c/o HTN.  Patient reports that she was at [facility] for a check up and was found to be hypertensive.  Patient reports headache, dizziness, frequent urination, dry mouth, hyperglycemia, and that she has high blood pressure.

## 2020-01-28 NOTE — ED ADULT NURSE REASSESSMENT NOTE - NS ED NURSE REASSESS COMMENT FT1
Received pt from Chang GUZMAN. PT resting with c/o chest pain from coughing. PT medicated per orders. Pending repeat fingerstick at 2200. CTM

## 2020-01-28 NOTE — ED CDU PROVIDER DISPOSITION NOTE - ADDITIONAL NOTES AND INSTRUCTIONS:
PT was evaluated At [facility] ED and was found to have a condition that warranted time of to rest and heal from WORK/SCHOOL.   Emile Moore PA-C

## 2020-01-28 NOTE — ED ADULT NURSE REASSESSMENT NOTE - NS ED NURSE REASSESS COMMENT FT1
Patient resting in bed comfortably., resp even/unlabored, lungs CTAB, VS stable, afebrile, IV fluids infusing well, awaiting for dispo, will continue to monitor patient.

## 2020-01-28 NOTE — ED CDU PROVIDER DISPOSITION NOTE - NSFOLLOWUPINSTRUCTIONS_ED_ALL_ED_FT
Patient education: Type 2 diabetes (The Basics)  View in Urdu  Written by the doctors and editors at [facility]  What is type 2 diabetes?  Type 2 diabetes (sometimes called type 2 "diabetes mellitus") is a disorder that disrupts the way your body uses sugar.    All the cells in your body need sugar to work normally. Sugar gets into the cells with the help of a hormone called insulin. If there is not enough insulin, or if the body stops responding to insulin, sugar builds up in the blood. That is what happens to people with diabetes.    There are 2 different types of diabetes. In type 1 diabetes, the problem is that the body makes little or no insulin. In type 2 diabetes, the problem is that:    ?The body's cells do not respond to insulin    ?The body does not make enough insulin    ?Or both    What are the symptoms of type 2 diabetes?  Type 2 diabetes usually causes no symptoms. When symptoms do occur, they include:    ?Needing to urinate often    ?Intense thirst    ?Blurry vision    If type 2 diabetes rarely causes symptoms, why should I care about it?  Even though type 2 diabetes might not make you feel sick, it can cause serious problems over time, if it is not treated. The disorder can lead to:    ?Heart attacks    ?Strokes    ?Kidney disease    ?Vision problems (or even blindness)    ?Pain or loss of feeling in the hands and feet    ?The need to have fingers, toes, or other body parts removed (amputated)    How do I know if I have type 2 diabetes?  To find out if you have type 2 diabetes, your doctor or nurse can do a blood test to measure the amount of sugar in your blood.    How is type 2 diabetes treated?  There are a few medicines that help control blood sugar. Some people need to take pills that help the body make more insulin or that help insulin do its job. Others need insulin shots.    Depending on what medicines you take, you might need to check your blood sugar regularly at home. But not everyone with type 2 diabetes needs to do this. Your doctor or nurse will tell you if you should be checking your blood sugar, and when and how to do this.    Sometimes, people with type 2 diabetes also need medicines to reduce the problems caused by the disease. For instance, medicines used to lower blood pressure can reduce the chances of a heart attack or stroke.    Medicines are not the only tool to manage diabetes. Being active, losing weight, eating right, and not smoking can all help people with diabetes stay as healthy as possible. It's also important to get the flu vaccine every year. Some people also need a vaccine to prevent pneumonia, too.    Can type 2 diabetes be prevented?  Yes, it can. To reduce your chances of getting type 2 diabetes, the most important thing you can do is control your weight. If you already have the disorder, losing weight can improve your health and blood sugar control. Being active can also help prevent or control the disorder.

## 2020-01-28 NOTE — ED PROVIDER NOTE - NS ED ROS FT
Constitutional: (-) fever  (-)chills  (-)sweats  Eyes/ENT: (-) blurry vision, (-) epistaxis  (-)rhinorrhea   (-) sore throat    Cardiovascular: (-) chest pain, (-) palpitations (-) edema   Respiratory: (+) cough, (-) shortness of breath   Gastrointestinal: (-)nausea  (-)vomiting, (-) diarrhea  (-) abdominal pain   :  (-)dysuria, (-)frequency, (+)urgency, (-)hematuria  Musculoskeletal: (-) neck pain, (-) back pain, (-) joint pain  Integumentary: (-) rash, (-) edema  Neurological: (-) headache, (-) altered mental status  (-)LOC

## 2020-01-28 NOTE — ED CDU PROVIDER INITIAL DAY NOTE - MEDICAL DECISION MAKING DETAILS
PT with stable VS, no acute distress, non toxic appearing, tolerating PO in the ED, PT found to be diabetic normo tensive, will start on Metformin dc home with follow up to [facility] clinic educated about when to return to the ED if needed. PT verbalizes that he understands all instructions and results.

## 2020-01-28 NOTE — ED CDU PROVIDER INITIAL DAY NOTE - OBJECTIVE STATEMENT
44yoF; with pmh signif for Depression; now p/w hyperglycemia and hypertension.  patient states she began having viral illness x1 week with cough--non-productive.  denies f/c/s.   denies cp/palp.  denies sick contacts. denies travel.  denies abd pain. denies n/v/d.  went to [facility] to be evaluated for cough.  found to be hypertensive and hyperglycemic(FS>600 at clinic) at [facility] and sent to ED for evaluation

## 2020-01-28 NOTE — ED ADULT TRIAGE NOTE - CHIEF COMPLAINT QUOTE
Patient arrived to ED today with c/o HTN.  Patient reports that [facility] was [facility] and was found to be hypertensive.  Patient reports headache, dizziness, frequent urination, dry mouth, and that she has high blood pressure. Patient arrived to ED today with c/o HTN.  Patient reports that she was at [facility] for a check up and was found to be hypertensive.  Patient reports headache, dizziness, frequent urination, dry mouth, hyperglycemia, and that she has high blood pressure.

## 2020-01-28 NOTE — ED PROVIDER NOTE - CLINICAL SUMMARY MEDICAL DECISION MAKING FREE TEXT BOX
patient arrived with new onset DM(HHS), now with controlled FS, preceded by URI with wheezing controlled s/p nebs. will send to [facility] for diabetes management.

## 2020-01-28 NOTE — ED ADULT NURSE NOTE - OBJECTIVE STATEMENT
pt care assumed at 1830, no apparent distress noted at this time. pt received A&OX3 sitting in bed comfortably with MD salgado at bedside. pt pt care assumed at 1830, no apparent distress noted at this time. pt received A&OX3 sitting in bed comfortably with MD salgado at bedside. pt c/o hyerglycemia and htn and depression. pt states she was sent from [facility] for elevated blood sugar. pt educated on plan of care, pt able to successfully teach back plan of care to RN, RN will continue to reeducate pt during hospital stay.

## 2020-01-28 NOTE — ED PROVIDER NOTE - PHYSICAL EXAMINATION
General:     NAD  Head:     NC/AT, EOMI, oral mucosa moist  Neck:     trachea midline  Lungs:   scattered exp wheeze, no retractions  CVS:     S1S2, RRR, no m/g/r  Abd:     +BS, s/nt/nd, no organomegaly  Ext:    2+ radial and pedal pulses, no c/c/e  Neuro: AAOx3, no sensory/motor deficits

## 2020-01-28 NOTE — ED CDU PROVIDER DISPOSITION NOTE - PATIENT PORTAL LINK FT
You can access the FollowMyHealth Patient Portal offered by [facility] by registering at the following website: [email or web address]. By joining [employer]’s FollowMyHealth portal, you will also be able to view your health information using other applications (apps) compatible with our system.

## 2020-01-28 NOTE — ED CDU PROVIDER DISPOSITION NOTE - CLINICAL COURSE
4yoF; with pmh signif for Depression; now p/w hyperglycemia and hypertension.  patient states she began having viral illness x1 week with cough--non-productive.  denies f/c/s.   denies cp/palp.  denies sick contacts. denies travel.  denies abd pain. denies n/v/d.  went to [facility] to be evaluated for cough.  found to be hypertensive and hyperglycemic(FS>600 at clinic) at [facility] and sent to ED for evaluation placed in obs for improved control PT with stable VS, no acute distress, non toxic appearing, tolerating PO in the ED, PT found to be diabetic normo tensive, will start on Metformin dc home with follow up to [facility] clinic educated about when to return to the ED if needed. PT verbalizes that he understands all instructions and results.

## 2020-01-28 NOTE — ED PROVIDER NOTE - OBJECTIVE STATEMENT
44yoF; with pmh signif for Depression; now p/w hyperglycemia and hypertension.  patient states she began having viral illness x1 week with cough--non-productive.  denies f/c/s.  c/o mild sob with cough. denies cp/palp.  denies sick contacts. denies travel.  denies abd pain. denies n/v/d.  went to [facility] to be evaluated for cough.  found to be hypertensive and hyperglycemic(FS>600 at clinic) at [facility] and sent to ED for evaluation of new onset DM and HTN.  in ED, BP normal, but FS elevated. denies dysuria, hematuria. c/o increased frequency, urgency. c/o polyphagia, polydipsia, polyuria x3 weeks. states she has been stress eating 2/2 to her depression. denies si/hi/ah/vh.  PMH: Depression, Asthma  SOCIAL: No tobacco/illicit substance use/socialEtOH

## 2020-01-30 LAB
CULTURE RESULTS: SIGNIFICANT CHANGE UP
SPECIMEN SOURCE: SIGNIFICANT CHANGE UP

## 2020-02-03 ENCOUNTER — EMERGENCY (EMERGENCY)
Facility: HOSPITAL | Age: 45
LOS: 1 days | Discharge: HOME | End: 2020-02-03
Attending: EMERGENCY MEDICINE
Payer: MEDICARE

## 2020-02-03 VITALS
RESPIRATION RATE: 19 BRPM | DIASTOLIC BLOOD PRESSURE: 76 MMHG | HEIGHT: 63 IN | OXYGEN SATURATION: 96 % | TEMPERATURE: 98 F | WEIGHT: 293 LBS | SYSTOLIC BLOOD PRESSURE: 115 MMHG | HEART RATE: 97 BPM

## 2020-02-03 LAB
ALBUMIN SERPL ELPH-MCNC: 3.8 G/DL — SIGNIFICANT CHANGE UP (ref 3.3–5.2)
ALP SERPL-CCNC: 163 U/L — HIGH (ref 40–120)
ALT FLD-CCNC: 49 U/L — HIGH
ANION GAP SERPL CALC-SCNC: 15 MMOL/L — SIGNIFICANT CHANGE UP (ref 5–17)
APPEARANCE UR: CLEAR — SIGNIFICANT CHANGE UP
AST SERPL-CCNC: 36 U/L — HIGH
BACTERIA # UR AUTO: NEGATIVE — SIGNIFICANT CHANGE UP
BASOPHILS # BLD AUTO: 0.01 K/UL — SIGNIFICANT CHANGE UP (ref 0–0.2)
BASOPHILS NFR BLD AUTO: 0.2 % — SIGNIFICANT CHANGE UP (ref 0–2)
BILIRUB SERPL-MCNC: 0.3 MG/DL — LOW (ref 0.4–2)
BILIRUB UR-MCNC: NEGATIVE — SIGNIFICANT CHANGE UP
BUN SERPL-MCNC: 8 MG/DL — SIGNIFICANT CHANGE UP (ref 8–20)
CALCIUM SERPL-MCNC: 9.2 MG/DL — SIGNIFICANT CHANGE UP (ref 8.6–10.2)
CHLORIDE SERPL-SCNC: 97 MMOL/L — LOW (ref 98–107)
CO2 SERPL-SCNC: 22 MMOL/L — SIGNIFICANT CHANGE UP (ref 22–29)
COLOR SPEC: YELLOW — SIGNIFICANT CHANGE UP
CREAT SERPL-MCNC: 0.68 MG/DL — SIGNIFICANT CHANGE UP (ref 0.5–1.3)
DIFF PNL FLD: NEGATIVE — SIGNIFICANT CHANGE UP
EOSINOPHIL # BLD AUTO: 0 K/UL — SIGNIFICANT CHANGE UP (ref 0–0.5)
EOSINOPHIL NFR BLD AUTO: 0 % — SIGNIFICANT CHANGE UP (ref 0–6)
EPI CELLS # UR: SIGNIFICANT CHANGE UP
GLUCOSE BLDC GLUCOMTR-MCNC: 200 MG/DL — HIGH (ref 70–99)
GLUCOSE BLDC GLUCOMTR-MCNC: 274 MG/DL — HIGH (ref 70–99)
GLUCOSE SERPL-MCNC: 358 MG/DL — HIGH (ref 70–99)
GLUCOSE UR QL: 1000 MG/DL
HBA1C BLD-MCNC: 15.4 % — HIGH (ref 4–5.6)
HCT VFR BLD CALC: 45.8 % — HIGH (ref 34.5–45)
HGB BLD-MCNC: 14.8 G/DL — SIGNIFICANT CHANGE UP (ref 11.5–15.5)
IMM GRANULOCYTES NFR BLD AUTO: 0.4 % — SIGNIFICANT CHANGE UP (ref 0–1.5)
KETONES UR-MCNC: NEGATIVE — SIGNIFICANT CHANGE UP
LEUKOCYTE ESTERASE UR-ACNC: NEGATIVE — SIGNIFICANT CHANGE UP
LYMPHOCYTES # BLD AUTO: 2.37 K/UL — SIGNIFICANT CHANGE UP (ref 1–3.3)
LYMPHOCYTES # BLD AUTO: 50.7 % — HIGH (ref 13–44)
MCHC RBC-ENTMCNC: 27.7 PG — SIGNIFICANT CHANGE UP (ref 27–34)
MCHC RBC-ENTMCNC: 32.3 GM/DL — SIGNIFICANT CHANGE UP (ref 32–36)
MCV RBC AUTO: 85.8 FL — SIGNIFICANT CHANGE UP (ref 80–100)
MONOCYTES # BLD AUTO: 0.47 K/UL — SIGNIFICANT CHANGE UP (ref 0–0.9)
MONOCYTES NFR BLD AUTO: 10.1 % — SIGNIFICANT CHANGE UP (ref 2–14)
NEUTROPHILS # BLD AUTO: 1.8 K/UL — SIGNIFICANT CHANGE UP (ref 1.8–7.4)
NEUTROPHILS NFR BLD AUTO: 38.6 % — LOW (ref 43–77)
NITRITE UR-MCNC: NEGATIVE — SIGNIFICANT CHANGE UP
PH UR: 6 — SIGNIFICANT CHANGE UP (ref 5–8)
PLATELET # BLD AUTO: 170 K/UL — SIGNIFICANT CHANGE UP (ref 150–400)
POTASSIUM SERPL-MCNC: 4.4 MMOL/L — SIGNIFICANT CHANGE UP (ref 3.5–5.3)
POTASSIUM SERPL-SCNC: 4.4 MMOL/L — SIGNIFICANT CHANGE UP (ref 3.5–5.3)
PROT SERPL-MCNC: 6.6 G/DL — SIGNIFICANT CHANGE UP (ref 6.6–8.7)
PROT UR-MCNC: 15 MG/DL
RBC # BLD: 5.34 M/UL — HIGH (ref 3.8–5.2)
RBC # FLD: 13.2 % — SIGNIFICANT CHANGE UP (ref 10.3–14.5)
RBC CASTS # UR COMP ASSIST: SIGNIFICANT CHANGE UP /HPF (ref 0–4)
SODIUM SERPL-SCNC: 134 MMOL/L — LOW (ref 135–145)
SP GR SPEC: 1.01 — SIGNIFICANT CHANGE UP (ref 1.01–1.02)
UROBILINOGEN FLD QL: NEGATIVE MG/DL — SIGNIFICANT CHANGE UP
WBC # BLD: 4.67 K/UL — SIGNIFICANT CHANGE UP (ref 3.8–10.5)
WBC # FLD AUTO: 4.67 K/UL — SIGNIFICANT CHANGE UP (ref 3.8–10.5)
WBC UR QL: SIGNIFICANT CHANGE UP

## 2020-02-03 PROCEDURE — 71046 X-RAY EXAM CHEST 2 VIEWS: CPT | Mod: 26

## 2020-02-03 PROCEDURE — 99220: CPT

## 2020-02-03 RX ORDER — LAMOTRIGINE 25 MG/1
200 TABLET, ORALLY DISINTEGRATING ORAL EVERY 12 HOURS
Refills: 0 | Status: DISCONTINUED | OUTPATIENT
Start: 2020-02-03 | End: 2020-02-11

## 2020-02-03 RX ORDER — MIRTAZAPINE 45 MG/1
15 TABLET, ORALLY DISINTEGRATING ORAL AT BEDTIME
Refills: 0 | Status: DISCONTINUED | OUTPATIENT
Start: 2020-02-03 | End: 2020-02-11

## 2020-02-03 RX ORDER — GLUCAGON INJECTION, SOLUTION 0.5 MG/.1ML
1 INJECTION, SOLUTION SUBCUTANEOUS ONCE
Refills: 0 | Status: DISCONTINUED | OUTPATIENT
Start: 2020-02-03 | End: 2020-02-11

## 2020-02-03 RX ORDER — IPRATROPIUM/ALBUTEROL SULFATE 18-103MCG
3 AEROSOL WITH ADAPTER (GRAM) INHALATION ONCE
Refills: 0 | Status: COMPLETED | OUTPATIENT
Start: 2020-02-03 | End: 2020-02-03

## 2020-02-03 RX ORDER — DEXTROSE 50 % IN WATER 50 %
25 SYRINGE (ML) INTRAVENOUS ONCE
Refills: 0 | Status: DISCONTINUED | OUTPATIENT
Start: 2020-02-03 | End: 2020-02-11

## 2020-02-03 RX ORDER — DEXTROSE 50 % IN WATER 50 %
15 SYRINGE (ML) INTRAVENOUS ONCE
Refills: 0 | Status: DISCONTINUED | OUTPATIENT
Start: 2020-02-03 | End: 2020-02-11

## 2020-02-03 RX ORDER — INSULIN GLARGINE 100 [IU]/ML
20 INJECTION, SOLUTION SUBCUTANEOUS AT BEDTIME
Refills: 0 | Status: DISCONTINUED | OUTPATIENT
Start: 2020-02-03 | End: 2020-02-11

## 2020-02-03 RX ORDER — CLONAZEPAM 1 MG
1.5 TABLET ORAL THREE TIMES A DAY
Refills: 0 | Status: COMPLETED | OUTPATIENT
Start: 2020-02-03 | End: 2020-02-10

## 2020-02-03 RX ORDER — INSULIN LISPRO 100/ML
12 VIAL (ML) SUBCUTANEOUS ONCE
Refills: 0 | Status: COMPLETED | OUTPATIENT
Start: 2020-02-03 | End: 2020-02-03

## 2020-02-03 RX ORDER — IPRATROPIUM/ALBUTEROL SULFATE 18-103MCG
3 AEROSOL WITH ADAPTER (GRAM) INHALATION EVERY 6 HOURS
Refills: 0 | Status: DISCONTINUED | OUTPATIENT
Start: 2020-02-03 | End: 2020-02-11

## 2020-02-03 RX ORDER — FLUOXETINE HCL 10 MG
40 CAPSULE ORAL
Refills: 0 | Status: DISCONTINUED | OUTPATIENT
Start: 2020-02-03 | End: 2020-02-11

## 2020-02-03 RX ORDER — OLANZAPINE 15 MG/1
10 TABLET, FILM COATED ORAL
Refills: 0 | Status: DISCONTINUED | OUTPATIENT
Start: 2020-02-03 | End: 2020-02-11

## 2020-02-03 RX ORDER — SODIUM CHLORIDE 9 MG/ML
1000 INJECTION, SOLUTION INTRAVENOUS
Refills: 0 | Status: DISCONTINUED | OUTPATIENT
Start: 2020-02-03 | End: 2020-02-11

## 2020-02-03 RX ORDER — DEXTROSE 50 % IN WATER 50 %
12.5 SYRINGE (ML) INTRAVENOUS ONCE
Refills: 0 | Status: DISCONTINUED | OUTPATIENT
Start: 2020-02-03 | End: 2020-02-11

## 2020-02-03 RX ORDER — SODIUM CHLORIDE 9 MG/ML
1000 INJECTION INTRAMUSCULAR; INTRAVENOUS; SUBCUTANEOUS ONCE
Refills: 0 | Status: COMPLETED | OUTPATIENT
Start: 2020-02-03 | End: 2020-02-03

## 2020-02-03 RX ADMIN — OLANZAPINE 10 MILLIGRAM(S): 15 TABLET, FILM COATED ORAL at 21:39

## 2020-02-03 RX ADMIN — Medication 3 MILLILITER(S): at 18:51

## 2020-02-03 RX ADMIN — Medication 40 MILLIGRAM(S): at 18:49

## 2020-02-03 RX ADMIN — LAMOTRIGINE 200 MILLIGRAM(S): 25 TABLET, ORALLY DISINTEGRATING ORAL at 21:39

## 2020-02-03 RX ADMIN — INSULIN GLARGINE 20 UNIT(S): 100 INJECTION, SOLUTION SUBCUTANEOUS at 21:38

## 2020-02-03 RX ADMIN — Medication 12 UNIT(S): at 13:04

## 2020-02-03 RX ADMIN — Medication 1.5 MILLIGRAM(S): at 21:41

## 2020-02-03 RX ADMIN — MIRTAZAPINE 15 MILLIGRAM(S): 45 TABLET, ORALLY DISINTEGRATING ORAL at 21:39

## 2020-02-03 RX ADMIN — SODIUM CHLORIDE 2000 MILLILITER(S): 9 INJECTION INTRAMUSCULAR; INTRAVENOUS; SUBCUTANEOUS at 13:04

## 2020-02-03 NOTE — ED ADULT NURSE REASSESSMENT NOTE - NS ED NURSE REASSESS COMMENT FT1
Care endorsed to MEGAN macias. Patient in NAD. Pending DM educator re-eval in am. RN with no further questions.

## 2020-02-03 NOTE — ED ADULT NURSE REASSESSMENT NOTE - NS ED NURSE REASSESS COMMENT FT1
pt educated on choosing site for insulin administration, cleaning site, and proper insulin injection technique. pt verbalizes understanding as evidenced by teach back method. pt demonstrated insulin injection correctly, tolerated well.

## 2020-02-03 NOTE — ED STATDOCS - NS_ ATTENDINGSCRIBEDETAILS _ED_A_ED_FT
I, Oleg Taylor, performed the initial face to face bedside interview with this patient regarding history of present illness, review of symptoms and relevant past medical, social and family history.  I completed an independent physical examination.  I was the provider who initially evaluated this patient.  The history, relevant review of systems, past medical and surgical history, medical decision making, and physical examination was documented by the scribe in my presence and I attest to the accuracy of the documentation. Follow-up on ordered tests (ie labs, radiologic studies) and re-evaluation of the patient's status has been communicated to the ACP.  Disposition of the patient will be based on test outcome and response to ED interventions.

## 2020-02-03 NOTE — ED CDU PROVIDER INITIAL DAY NOTE - OBJECTIVE STATEMENT
45 y/o F pt with significant PMHx of bipolar disorder, asthma and diabetes mellitus presents to the ED complaining of hyperglycemia. Patient states she cannot get her blood sugar down past 400. Reports feeling lightheaded, dry mouth, headaches and body shakes, excessive thirst and urination. Patient was here last week and was discharged Metformin but states it has not been helping. Reports family history of diabetes mellitus. Smoker.  HbA1c >15

## 2020-02-03 NOTE — ED ADULT TRIAGE NOTE - CHIEF COMPLAINT QUOTE
"My blood sugar is 380 I can't get it down and I was here last week and they gave me 3 bags of fluid to get it down and Metformin and it is not working, I feel shaky and my mouth is dry. "  Pt A & OX4

## 2020-02-03 NOTE — ED STATDOCS - OBJECTIVE STATEMENT
45 y/o F pt with significant PMHx of bipolar disorder, asthma and diabetes mellitus presents to the ED complaining of hyperglycemia. Patient states she cannot get her blood sugar down past 400. Reports feeling lightheaded, dry mouth, headaches and body shakes. Patient was here last week and was discharged Metformin but states it has not been helping. Reports family history of diabetes mellitus. Smoker.

## 2020-02-03 NOTE — ED ADULT NURSE REASSESSMENT NOTE - NS ED NURSE REASSESS COMMENT FT1
Assumed care of the patient at 1830. Verbal report received from Ariela GUZMAN ED. Patient transported to CDU 11, observation unit. Patient A&Ox4. No s/s of distress or pain. VSS. Patient pending DM educator in am. Patient in understanding of plan of care. Patient with no further questions for the RN. Resting in comfort. Call bell within reach and encouraged to use when assistance needed. Will continue to monitor. Assumed care of the patient at 1830. Verbal report received from Ariela GUZMAN ED. Patient transported to CDU 11, observation unit. Patient A&Ox4. No s/s of distress or pain. VSS. Patient pending DM educator in am. +cough and wheezing, recently dx with bronchitis, no SOB, duoneb given as per orders, will re-asses. Patient in understanding of plan of care. Patient with no further questions for the RN. Resting in comfort. Call bell within reach and encouraged to use when assistance needed. Will continue to monitor.

## 2020-02-03 NOTE — ADVANCED PRACTICE NURSE CONSULT - ASSESSMENT
went to see pt in afternoon, pt is a=ox3 c/o 0 pain. pt states she was called to the pcp office [facility] last week her bg was 580, she was started on metformin 500 mg 2xdaily. since than her bg is not below 400. pt was educated about diabetes disease process and the result of hemoglobin a1c >15 and the importance to add insulin @ this time. pt verbalized understanding. a new glucometer provided and pt was educated about the importance of bg monitroing. she was advised to check bg 3-4xdaily, parameters provided.

## 2020-02-03 NOTE — ADVANCED PRACTICE NURSE CONSULT - RECOMMEDATIONS
continue diabetes self management education  pls start pt on lantus continue diabetes self management education  pls start pt on lantus 20 units qhs  pls start humalog ss before meals  glucose poc  pls consider dc pt on basaglar and novolog insulin pen  satya needles  strips and lancets for 3xdaily for countur next easy glucometer  cc- pls set fu appointment w [facility] to improve glycemic control

## 2020-02-03 NOTE — ED ADULT NURSE NOTE - OBJECTIVE STATEMENT
"My blood sugar is 380 I can't get it down and I was here last week and they gave me 3 bags of fluid to get it down and Metformin and it is not working, I feel shaky and my mouth is dry. "  Pt A & OX4. Pt also c/o  feeling dizzy and lightheaded,+ abd pain

## 2020-02-03 NOTE — ED CDU PROVIDER INITIAL DAY NOTE - ATTENDING CONTRIBUTION TO CARE
I, Amaris Denson MD have personally performed a face to face diagnostic evaluation on this patient.  I have reviewed the ACP note and agree with the history, exam, and plan of care, except as noted.     Exam:  Head: atraumatic, normacephalic  Face: atraumatic, no crepitus no orbiral/maxillary/mandibular ttp  throat: uvula midline no exudates  eyes: perrla eomi  heart: rrr s1s2  lungs: ctab  abd: soft, nt nd +bs no rebound/guarding no cva ttp  skin: warm  LE: no swelling, no calf ttp  back: no midline cervical/thoracic/lumbar ttp    --in obs for hyperglycemia --insuloin diabetic education reasses

## 2020-02-03 NOTE — ED STATDOCS - PROGRESS NOTE DETAILS
Results noted and d/w pt. No signs of DKA/HSS, will admit to observation for better DM control and diabetic educator consult

## 2020-02-03 NOTE — ED ADULT NURSE REASSESSMENT NOTE - NS ED NURSE REASSESS COMMENT FT1
assumed care of pt @ 1930, report received from Daisy GUZMAN, charting as noted. pt AOx4 in NAD, Vital Signs Stable. pt denies polyuria/polydipsia at this time. pt placed on observation blood sugar monitor and diabetic education. next fingerstick and Lantus injection @ 2100. pt to demonstrate self injection of Lantus tonight. pt agreeable with plan of care. safety maintained: stretcher locked in lowest position, call bell in reach, side rails up, non skid socks on.

## 2020-02-04 VITALS
HEART RATE: 85 BPM | OXYGEN SATURATION: 96 % | RESPIRATION RATE: 18 BRPM | SYSTOLIC BLOOD PRESSURE: 120 MMHG | TEMPERATURE: 99 F | DIASTOLIC BLOOD PRESSURE: 80 MMHG

## 2020-02-04 LAB
ALBUMIN SERPL ELPH-MCNC: 3.2 G/DL — LOW (ref 3.3–5.2)
ALP SERPL-CCNC: 142 U/L — HIGH (ref 40–120)
ALT FLD-CCNC: 38 U/L — HIGH
ANION GAP SERPL CALC-SCNC: 11 MMOL/L — SIGNIFICANT CHANGE UP (ref 5–17)
AST SERPL-CCNC: 29 U/L — SIGNIFICANT CHANGE UP
BILIRUB SERPL-MCNC: <0.2 MG/DL — LOW (ref 0.4–2)
BUN SERPL-MCNC: 8 MG/DL — SIGNIFICANT CHANGE UP (ref 8–20)
CALCIUM SERPL-MCNC: 8.2 MG/DL — LOW (ref 8.6–10.2)
CHLORIDE SERPL-SCNC: 102 MMOL/L — SIGNIFICANT CHANGE UP (ref 98–107)
CO2 SERPL-SCNC: 24 MMOL/L — SIGNIFICANT CHANGE UP (ref 22–29)
CREAT SERPL-MCNC: 0.67 MG/DL — SIGNIFICANT CHANGE UP (ref 0.5–1.3)
GLUCOSE BLDC GLUCOMTR-MCNC: 257 MG/DL — HIGH (ref 70–99)
GLUCOSE BLDC GLUCOMTR-MCNC: 272 MG/DL — HIGH (ref 70–99)
GLUCOSE SERPL-MCNC: 289 MG/DL — HIGH (ref 70–99)
POTASSIUM SERPL-MCNC: 3.8 MMOL/L — SIGNIFICANT CHANGE UP (ref 3.5–5.3)
POTASSIUM SERPL-SCNC: 3.8 MMOL/L — SIGNIFICANT CHANGE UP (ref 3.5–5.3)
PROT SERPL-MCNC: 5.6 G/DL — LOW (ref 6.6–8.7)
SODIUM SERPL-SCNC: 137 MMOL/L — SIGNIFICANT CHANGE UP (ref 135–145)

## 2020-02-04 PROCEDURE — 80053 COMPREHEN METABOLIC PANEL: CPT

## 2020-02-04 PROCEDURE — 71046 X-RAY EXAM CHEST 2 VIEWS: CPT

## 2020-02-04 PROCEDURE — 85027 COMPLETE CBC AUTOMATED: CPT

## 2020-02-04 PROCEDURE — 99217: CPT

## 2020-02-04 PROCEDURE — 81001 URINALYSIS AUTO W/SCOPE: CPT

## 2020-02-04 PROCEDURE — 36415 COLL VENOUS BLD VENIPUNCTURE: CPT

## 2020-02-04 PROCEDURE — 94640 AIRWAY INHALATION TREATMENT: CPT

## 2020-02-04 PROCEDURE — 82962 GLUCOSE BLOOD TEST: CPT

## 2020-02-04 PROCEDURE — 99285 EMERGENCY DEPT VISIT HI MDM: CPT | Mod: 25

## 2020-02-04 PROCEDURE — G0378: CPT

## 2020-02-04 PROCEDURE — 83036 HEMOGLOBIN GLYCOSYLATED A1C: CPT

## 2020-02-04 RX ORDER — INSULIN LISPRO 100/ML
VIAL (ML) SUBCUTANEOUS
Refills: 0 | Status: DISCONTINUED | OUTPATIENT
Start: 2020-02-04 | End: 2020-02-11

## 2020-02-04 RX ORDER — INSULIN ASPART 100 [IU]/ML
10 INJECTION, SOLUTION SUBCUTANEOUS
Qty: 6 | Refills: 0
Start: 2020-02-04 | End: 2020-03-04

## 2020-02-04 RX ORDER — INSULIN GLARGINE 100 [IU]/ML
30 INJECTION, SOLUTION SUBCUTANEOUS
Qty: 10 | Refills: 0
Start: 2020-02-04 | End: 2020-03-04

## 2020-02-04 RX ADMIN — Medication 3 MILLILITER(S): at 09:19

## 2020-02-04 RX ADMIN — Medication 6: at 08:47

## 2020-02-04 RX ADMIN — LAMOTRIGINE 200 MILLIGRAM(S): 25 TABLET, ORALLY DISINTEGRATING ORAL at 09:54

## 2020-02-04 RX ADMIN — OLANZAPINE 10 MILLIGRAM(S): 15 TABLET, FILM COATED ORAL at 09:54

## 2020-02-04 RX ADMIN — Medication 3 MILLILITER(S): at 03:25

## 2020-02-04 RX ADMIN — Medication 1.5 MILLIGRAM(S): at 09:53

## 2020-02-04 RX ADMIN — Medication 40 MILLIGRAM(S): at 09:53

## 2020-02-04 RX ADMIN — Medication 6: at 12:28

## 2020-02-04 NOTE — ADVANCED PRACTICE NURSE CONSULT - ASSESSMENT
return to see pt in afternoon. pt is a+ox3 c/o 0 pain pt was educated about the 2 types of insulin she will be taking the different schedule and action. pt verbalized understanding.  she states she feels confident using insulin pen. pt was educated about healthy eating habits and assisted her to make healthy choices for meals and snacks to fit into her life style. the plate method was used to teach portions. pt has a fu appointment w dr victor on february 19 2020 at 9a.

## 2020-02-04 NOTE — ADVANCED PRACTICE NURSE CONSULT - RECOMMEDATIONS
continue diabetes self management education  pls consider dc pt on basaglar insulin pen 30 units qhs  novolog flex pen 10 units before meals  satya needles  lancets and strips for countur next ez glucometer for 3xdaily  cc- pls set fu appointment w [facility] service [facility]

## 2020-02-04 NOTE — ED CDU PROVIDER SUBSEQUENT DAY NOTE - ATTENDING CONTRIBUTION TO CARE
I, Graciela Conway, performed a face to face bedside interview with this patient regarding history of present illness, review of symptoms and relevant past medical, social and family history.  I completed an independent physical examination. Medical decision making, follow-up on ordered tests (ie labs, radiologic studies) and re-evaluation of the patient's status has been communicated to the ACP.  Disposition of the patient will be based on test outcome and response to ED interventions.     pending DM educator dick. no complaints overnight.

## 2020-02-04 NOTE — ED CDU PROVIDER DISPOSITION NOTE - CARE PROVIDER_API CALL
Mckenzie Bains)  EndocrinologyMetabDiabetes  [address], [place] [id number]  Phone: [phone]  Fax: [phone]  Follow Up Time: 7-10 Days

## 2020-02-04 NOTE — ED ADULT NURSE REASSESSMENT NOTE - RESPIRATORY WDL
Breathing spontaneous and unlabored. Breath sounds clear and equal bilaterally with regular rhythm.
Breathing spontaneous and unlabored. Breath sounds clear and equal bilaterally with regular rhythm. Pt denies any SOB.
Breathing spontaneous and unlabored. Breath sounds clear and equal bilaterally with regular rhythm.

## 2020-02-04 NOTE — ED CDU PROVIDER DISPOSITION NOTE - PATIENT PORTAL LINK FT
You can access the FollowMyHealth Patient Portal offered by [facility] by registering at the following website: [email or web address]. By joining [employer]’s FollowMyHealth portal, you will also be able to view your health information using other applications (apps) compatible with our system.

## 2020-02-04 NOTE — DISCHARGE NOTE NURSING/CASE MANAGEMENT/SOCIAL WORK - PATIENT PORTAL LINK FT
You can access the FollowMyHealth Patient Portal offered by [facility] by registering at the following website: [email or web address]. By joining [employer]’s FollowMyHealth portal, you will also be able to view your health information using other applications (apps) compatible with our system.

## 2020-02-04 NOTE — ED ADULT NURSE REASSESSMENT NOTE - NS ED NURSE REASSESS COMMENT FT1
Pt alert and oriented, no apparent distress noted at this time. Pt handed off to Jae GUZMAN in stable condition.

## 2020-02-04 NOTE — PROVIDER CONTACT NOTE (OTHER) - ASSESSMENT
[facility] called pt. and informed her that she has a f/u appt. scheduled at 3.30 pm on 2/13/2020 at [facility] with the medical doctor.  Pt. acknowledged and accepted the time and date.

## 2020-02-04 NOTE — ED ADULT NURSE REASSESSMENT NOTE - GENITOURINARY WDL
Bladder non-tender and non-distended. Urine clear yellow.
Bladder non-tender and non-distended. Urine clear yellow.
Bladder non-tender and non-distended.

## 2020-02-04 NOTE — ED ADULT NURSE REASSESSMENT NOTE - GENERAL PATIENT STATE
cooperative/smiling/interactive/resting/sleeping/comfortable appearance
comfortable appearance/resting/sleeping/smiling/interactive/cooperative
smiling/interactive/cooperative/resting/sleeping/comfortable appearance
comfortable appearance/cooperative

## 2020-02-04 NOTE — ED CDU PROVIDER DISPOSITION NOTE - ATTENDING CONTRIBUTION TO CARE
I, Graciela Conway, performed a face to face bedside interview with this patient regarding history of present illness, review of symptoms and relevant past medical, social and family history.  I completed an independent physical examination. Medical decision making, follow-up on ordered tests (ie labs, radiologic studies) and re-evaluation of the patient's status has been communicated to the ACP.  Disposition of the patient will be based on test outcome and response to ED interventions.     Seen by Lizeth GUZMAN, educated on proper insulin usage, Rx sent to pharmacy. outpt Follow up

## 2020-02-04 NOTE — ED CDU PROVIDER DISPOSITION NOTE - CLINICAL COURSE
44 year old female with DM presents to the ED for elevated sugars and HgA1C. Pt placed in obs for DM education. DM saw pt and reccomends Basiglar 30u at night and Novalog 10u before 2 meals a day. Pt to f/u with endocrinology in 1 week. Pt feeling well at this time.

## 2020-02-04 NOTE — ED ADULT NURSE REASSESSMENT NOTE - COMFORT CARE
wait time explained/ambulated to bathroom/plan of care explained/po fluids offered/meal provided
ambulated to bathroom/po fluids offered/meal provided/wait time explained/plan of care explained
plan of care explained/po fluids offered/meal provided/wait time explained/assisted to bathroom
side rails up/ambulated to bathroom/plan of care explained/po fluids offered/wait time explained/meal provided

## 2020-02-07 PROBLEM — E66.01 MORBID (SEVERE) OBESITY DUE TO EXCESS CALORIES: Chronic | Status: ACTIVE | Noted: 2020-02-03

## 2020-02-13 ENCOUNTER — APPOINTMENT (OUTPATIENT)
Dept: FAMILY MEDICINE | Facility: CLINIC | Age: 45
End: 2020-02-13
Payer: MEDICARE

## 2020-02-13 DIAGNOSIS — Z78.9 OTHER SPECIFIED HEALTH STATUS: ICD-10-CM

## 2020-02-13 DIAGNOSIS — Z82.69 FAMILY HISTORY OF OTHER DISEASES OF THE MUSCULOSKELETAL SYSTEM AND CONNECTIVE TISSUE: ICD-10-CM

## 2020-02-13 DIAGNOSIS — Z82.61 FAMILY HISTORY OF ARTHRITIS: ICD-10-CM

## 2020-02-13 DIAGNOSIS — F17.210 NICOTINE DEPENDENCE, CIGARETTES, UNCOMPLICATED: ICD-10-CM

## 2020-02-13 DIAGNOSIS — Z82.49 FAMILY HISTORY OF ISCHEMIC HEART DISEASE AND OTHER DISEASES OF THE CIRCULATORY SYSTEM: ICD-10-CM

## 2020-02-13 DIAGNOSIS — G47.00 INSOMNIA, UNSPECIFIED: ICD-10-CM

## 2020-02-13 DIAGNOSIS — Z83.3 FAMILY HISTORY OF DIABETES MELLITUS: ICD-10-CM

## 2020-02-13 PROCEDURE — 99406 BEHAV CHNG SMOKING 3-10 MIN: CPT

## 2020-02-13 PROCEDURE — 99214 OFFICE O/P EST MOD 30 MIN: CPT | Mod: 25

## 2020-02-19 ENCOUNTER — APPOINTMENT (OUTPATIENT)
Dept: ENDOCRINOLOGY | Facility: CLINIC | Age: 45
End: 2020-02-19
Payer: MEDICARE

## 2020-02-19 VITALS — HEIGHT: 63 IN | WEIGHT: 293 LBS | BODY MASS INDEX: 51.91 KG/M2

## 2020-02-19 DIAGNOSIS — Z86.39 PERSONAL HISTORY OF OTHER ENDOCRINE, NUTRITIONAL AND METABOLIC DISEASE: ICD-10-CM

## 2020-02-19 DIAGNOSIS — Z86.59 PERSONAL HISTORY OF OTHER MENTAL AND BEHAVIORAL DISORDERS: ICD-10-CM

## 2020-02-19 PROCEDURE — 99204 OFFICE O/P NEW MOD 45 MIN: CPT

## 2020-02-19 RX ORDER — FLUTICASONE PROPIONATE 50 UG/1
50 SPRAY, METERED NASAL
Refills: 0 | Status: ACTIVE | COMMUNITY

## 2020-02-19 NOTE — ASSESSMENT
[Carbohydrate Consistent Diet] : carbohydrate consistent diet [Diabetes Foot Care] : diabetes foot care [Smoking Cessation] : smoking cessation [Importance of Diet and Exercise] : importance of diet and exercise to improve glycemic control, achieve weight loss and improve cardiovascular health [Self Monitoring of Blood Glucose] : self monitoring of blood glucose [Insulin Self-Administration] : insulin self-administration [Retinopathy Screening] : Patient was referred to ophthalmology for retinopathy screening [FreeTextEntry1] : Dm2 newly diagnosed , poorly controlled still with recent A1c 15. Has COPD treated with steroids when acute and has bipolar disease in treatment with mood stabilizer. \par \par continue metformin BID \par She takes basal -bolus regimen. Bgs 250 in am , dinner 250-325   bedtime 300's \par increase basaglar to 40 u HS \par increase novolog to 15 u TID w meals. \par check logbook and fax in 1 week \par discussed diet and exercise\par encouraged more exercise walking 30 min 3 x week\par Discussed complications of diabetes at length including MI/CVA/ESRD/dialysis/blindness/amputations/infections\par See CDE for diet teaching\par Needs to try to have more protein for meals\par Importance of blood glucose monitoring discussed. Targets reviewed. Recommended pt try to keep blood glucose level below 130 (110) before meals and below 160 (140) two hours after meals.\par check microalb spot \par CDE appt to work on diet \par check GFr \par flushot declined\par eye exam Nov 2019 but has blurry vision . Advised to have exam when Bgs have been corrected.\par \par HTN: not clear if diagnosis . She states that she had some high values in the past and otherwise she 's been normal. We were not able to check her BP since we don't have that large a cuff \par I advised low fat/low cholesterol diet, low salt diet, and weight loss\par  \par HLD  : she was just started on statin lipitor 20 mg qd .\par recheck lipids in 3 mo. \par low fat/low cholesterol diet and weight loss advised\par \par morbid obesity: \par discussed diet and exercise\par encouraged more exercise walking 30 min 3 x week\par she started changing her diet, she has depression and ate "everything" she said. \par

## 2020-02-24 RX ORDER — LANCETS 33 GAUGE
EACH MISCELLANEOUS
Qty: 400 | Refills: 0 | Status: DISCONTINUED | COMMUNITY
Start: 2020-02-19 | End: 2020-02-24

## 2020-02-27 VITALS
OXYGEN SATURATION: 97 % | SYSTOLIC BLOOD PRESSURE: 140 MMHG | HEART RATE: 78 BPM | RESPIRATION RATE: 18 BRPM | TEMPERATURE: 98.6 F | DIASTOLIC BLOOD PRESSURE: 96 MMHG

## 2020-03-11 ENCOUNTER — APPOINTMENT (OUTPATIENT)
Dept: ENDOCRINOLOGY | Facility: CLINIC | Age: 45
End: 2020-03-11

## 2020-03-11 NOTE — PAST MEDICAL HISTORY
[Menarche Age ____] : age at menarche was [unfilled] [Live Births ___] : P[unfilled]  [Total Preg ___] : G[unfilled] [Full Term ___] : Full Term: [unfilled] [Abortions ___] : Abortions:[unfilled] [Living ___] : Living: [unfilled] [FreeTextEntry1] : no period for more than 6 months, not sexually active

## 2020-03-11 NOTE — REVIEW OF SYSTEMS
[Fatigue] : fatigue [Vision Problems] : vision problems [Shortness Of Breath] : shortness of breath [Cough] : cough [Dyspnea on Exertion] : dyspnea on exertion [Insomnia] : insomnia [Depression] : depression [Anxiety] : anxiety [Fever] : no fever [Chills] : no chills [Hot Flashes] : no hot flashes [Night Sweats] : no night sweats [Recent Change In Weight] : ~T no recent weight change [Discharge] : no discharge [Pain] : no pain [Redness] : no redness [Dryness] : no dryness  [Itching] : no itching [Earache] : no earache [Hearing Loss] : no hearing loss [Nosebleed] : no nosebleeds [Hoarseness] : no hoarseness [Nasal Discharge] : no nasal discharge [Sore Throat] : no sore throat [Postnasal Drip] : no postnasal drip [Chest Pain] : no chest pain [Palpitations] : no palpitations [Leg Claudication] : no leg claudication [Lower Ext Edema] : no lower extremity edema [Orthopnea] : no orthopnea [Paroxysmal Nocturnal Dyspnea] : no paroxysmal nocturnal dyspnea [Abdominal Pain] : no abdominal pain [Nausea] : no nausea [Constipation] : no constipation [Diarrhea] : diarrhea [Vomiting] : no vomiting [Heartburn] : no heartburn [Melena] : no melena [Dysuria] : no dysuria [Incontinence] : no incontinence [Nocturia] : no nocturia [Poor Libido] : libido not poor [Hematuria] : no hematuria [Frequency] : no frequency [Vaginal Discharge] : no vaginal discharge [Dysmenorrhea] : no dysmenorrhea [Joint Pain] : no joint pain [Joint Stiffness] : no joint stiffness [Joint Swelling] : no joint swelling [Muscle Weakness] : no muscle weakness [Muscle Pain] : no muscle pain [Back Pain] : no back pain [Mole Changes] : no mole changes [Skin Rash] : no skin rash [Headache] : no headache [Dizziness] : no dizziness [Fainting] : no fainting [Confusion] : no confusion [Memory Loss] : no memory loss [Unsteady Walking] : no ataxia [Suicidal] : not suicidal [Easy Bleeding] : no easy bleeding [Easy Bruising] : no easy bruising [Swollen Glands] : no swollen glands [FreeTextEntry3] : wears glasses

## 2020-03-11 NOTE — PLAN
[FreeTextEntry1] : 1-  DM:  uncontrolled: BS= 263\par \par 2-  Asthma:  Continue with Advair and albuterol inhaler as ordered.  Continue to f/u with pulmonology.\par \par 3-  Obesity:  Discussed Diet and exercise:  Encouraged to eat smaller portions 3-4 times/day, keep food diary, Weigh self weekly.\par \par 4-  Tobacco used:  Smoking cessation counseling given.  Discussed the risk of tobacco used, and the benefits of smoking cessation.  Encouraged to go to smoking cessation classes.  Information given for [facility] cessation classes:  [facility] [phone], [facility][1], [phone],  [place] [facility] [phone].\par \par 5-  Health Maintenance:  Discussed Life style modification, healthy diet, low salts, fats, sweets, less juices/soda/fried food, cheese.  More water, fruits, vegetables.   Encouraged to eat smaller portions 3-4 times/day, keep food diary, Weigh self weekly.  Encouraged to sleep 8 hours/night, get plenty of rest.  Discussed dental care, floss/brush after each meals, dental check every 6 months.  Annual eyes check.  Educated about Diabetes, signs and symptoms, encouraged to monitor BS, discussed the importance of taking medications as ordered.  \par \par 6-  Bipolar/depression/anxiety:  Continue to f/u with psychiatrist/therapist,  continue to take psych medications as ordered.\par \par 7-  f/u in 10-12 wks or prn

## 2020-03-11 NOTE — HEALTH RISK ASSESSMENT
[Intercurrent ED visits] : went to ED [26-29] : 26-04 [] : Yes [No falls in past year] : Patient reported no falls in the past year [No] : In the past 12 months have you used drugs other than those required for medical reasons? No [1] : 2) Feeling down, depressed, or hopeless for several days (1) [de-identified] : ER visit  at [place] in January and last week for elevaqted BS [de-identified] : walking [de-identified] : trying to watch diet, started few weeks ago.

## 2020-03-11 NOTE — PHYSICAL EXAM
[Soft] : abdomen soft [Non Tender] : non-tender [Non-distended] : non-distended [No Masses] : no abdominal mass palpated [No HSM] : no HSM [Normal Bowel Sounds] : normal bowel sounds [Normal] : affect was normal and insight and judgment were intact [Comprehensive Foot Exam Normal] : Right and left foot were examined and both feet are normal. No ulcers in either foot. Toes are normal and with full ROM.  Normal tactile sensation with monofilament testing throughout both feet [de-identified] : Large

## 2020-03-11 NOTE — HISTORY OF PRESENT ILLNESS
[FreeTextEntry1] : F/u visit [de-identified] : 44 y.o female with PMHx of  Asthma, diabetic, obesity, sleep disorder, HLD, bipolar.  Here today for f/u visit and to discuss labs.  reported was seen at [facility] ER for elevated blood sugar. Reported taking all meds every day, checking BS at home, stated BS getting better, but still elevated.  Trying to watch diet, trying to exercise.  Has appointment with endocrinology.  Denies cp, palpitation, sob, dizziness, n/v, edema.  No polyuria, polyphagia, polydipsia.

## 2020-03-11 NOTE — COUNSELING
[Behavioral health counseling provided] : Behavioral health counseling provided [Sleep ___ hours/day] : Sleep [unfilled] hours/day [Engage in a relaxing activity] : Engage in a relaxing activity [Plan in advance] : Plan in advance [Potential consequences of obesity discussed] : Potential consequences of obesity discussed [Benefits of weight loss discussed] : Benefits of weight loss discussed [Structured Weight Management Program suggested:] : Structured weight management program suggested [Encouraged to increase physical activity] : Encouraged to increase physical activity [Encouraged to maintain food diary] : Encouraged to maintain food diary [Encouraged to use exercise tracking device] : Encouraged to use exercise tracking device [Target Wt Loss Goal ___] : Weight Loss Goals: Target weight loss goal [unfilled] lbs [Weigh Self Weekly] : weigh self weekly [Decrease Portions] : decrease portions [____ min/wk Activity] : [unfilled] min/wk activity [Keep Food Diary] : keep food diary [Patient motivation] : Patient motivation [Good understanding] : Patient has a good understanding of lifestyle changes and steps needed to achieve self management goal [Risk of tobacco use and health benefits of smoking cessation discussed] : Risk of tobacco use and health benefits of smoking cessation discussed [Cessation strategies including cessation program discussed] : Cessation strategies including cessation program discussed [Use of nicotine replacement therapies and other medications discussed] : Use of nicotine replacement therapies and other medications discussed [Encouraged to pick a quit date and identify support needed to quit] : Encouraged to pick a quit date and identify support needed to quit [Tobacco Use Cessation Intermediate Greater Than 3 Minutes Up to 10 Minutes] : Tobacco Use Cessation Intermediate Greater Than 3 Minutes Up to 10 Minutes [FreeTextEntry1] : 5 [FreeTextEntry4] : 15

## 2020-03-30 RX ORDER — BLOOD-GLUCOSE METER
KIT MISCELLANEOUS
Qty: 1 | Refills: 0 | Status: ACTIVE | COMMUNITY
Start: 2020-03-30 | End: 1900-01-01

## 2020-04-01 ENCOUNTER — APPOINTMENT (OUTPATIENT)
Dept: ENDOCRINOLOGY | Facility: CLINIC | Age: 45
End: 2020-04-01

## 2020-04-20 ENCOUNTER — APPOINTMENT (OUTPATIENT)
Dept: ENDOCRINOLOGY | Facility: CLINIC | Age: 45
End: 2020-04-20
Payer: MEDICARE

## 2020-04-20 PROCEDURE — 99442: CPT

## 2020-04-23 ENCOUNTER — APPOINTMENT (OUTPATIENT)
Dept: ENDOCRINOLOGY | Facility: CLINIC | Age: 45
End: 2020-04-23

## 2020-06-02 ENCOUNTER — APPOINTMENT (OUTPATIENT)
Dept: ENDOCRINOLOGY | Facility: CLINIC | Age: 45
End: 2020-06-02
Payer: MEDICARE

## 2020-06-02 VITALS — BODY MASS INDEX: 51.91 KG/M2 | WEIGHT: 293 LBS | HEIGHT: 63 IN

## 2020-06-02 PROCEDURE — 99214 OFFICE O/P EST MOD 30 MIN: CPT | Mod: 95

## 2020-06-03 ENCOUNTER — APPOINTMENT (OUTPATIENT)
Dept: FAMILY MEDICINE | Facility: CLINIC | Age: 45
End: 2020-06-03
Payer: MEDICARE

## 2020-06-03 PROCEDURE — 99441: CPT | Mod: 95

## 2020-06-03 NOTE — ASSESSMENT
[Carbohydrate Consistent Diet] : carbohydrate consistent diet [Exercise/Effect on Glucose] : exercise/effect on glucose [Importance of Diet and Exercise] : importance of diet and exercise to improve glycemic control, achieve weight loss and improve cardiovascular health [FreeTextEntry1] : Dm2 newly diagnosed. Gas COPD treated with steroids when acute and has bipolar disease in treatment with mood stabilizer. She feels much better with better energy level and no polys. \par she has severe diarrhea with MF 1000 \par decrease  mg BID and let me know if still diarrhea w low dose.\par bgs 130-200 in am after meals 250s. \par continue basaglar 44 u Hs \par increase novolog to 21 u TID w meals.  \par check logbook and fax in 1 week \par encouraged more exercise walking 30 min 3 x week\par Discussed complications of diabetes at length including MI/CVA/ESRD/dialysis/blindness/amputations/infections\par CDE done and she is working on her diet. \par eats whole wheat pasta, bread, a lot of veggies. \par Importance of blood glucose monitoring discussed. Targets reviewed. Recommended pt try to keep blood glucose level below 130 (110) before meals and below 160 (140) two hours after meals.\par check microalb spot \par check GFr \par eye exam Nov 2019 but she still has blurry vision \par \par HTN: intermittently.  She is getting a cuff to start checking at home. \par I advised low fat/low cholesterol diet, low salt diet, and weight loss\par  \par HLD  : lipitor started in 3 mos. recheck lipids in 3 mo. \par low fat/low cholesterol diet and weight loss advised\par \par morbid obesity:  encouraged more exercise walking 30 min 3 x week.she gained 30 lbs because she did not leave her house due to covid. \par \par This was a telehealth service rendered via interactive audio and video telecommunication\par  system.\par \par

## 2020-06-03 NOTE — PHYSICAL EXAM
[Alert] : alert [Well Nourished] : well nourished [Well Developed] : well developed [No Acute Distress] : no acute distress [Normal Sclera/Conjunctiva] : normal sclera/conjunctiva [EOMI] : extra ocular movement intact [Normal Oropharynx] : the oropharynx was normal [No Proptosis] : no proptosis [Thyroid Not Enlarged] : the thyroid was not enlarged [No Thyroid Nodules] : no palpable thyroid nodules [Clear to Auscultation] : lungs were clear to auscultation bilaterally [No Accessory Muscle Use] : no accessory muscle use [No Respiratory Distress] : no respiratory distress [Normal S1, S2] : normal S1 and S2 [Normal Rate] : heart rate was normal [Pedal Pulses Normal] : the pedal pulses are present [Regular Rhythm] : with a regular rhythm [No Edema] : no peripheral edema [Normal Bowel Sounds] : normal bowel sounds [Not Tender] : non-tender [Normal Anterior Cervical Nodes] : no anterior cervical lymphadenopathy [Not Distended] : not distended [Soft] : abdomen soft [No Spinal Tenderness] : no spinal tenderness [Spine Straight] : spine straight [Normal Posterior Cervical Nodes] : no posterior cervical lymphadenopathy [No Stigmata of Cushings Syndrome] : no stigmata of Cushings Syndrome [Normal Strength/Tone] : muscle strength and tone were normal [Normal Gait] : normal gait [No Rash] : no rash [Normal Reflexes] : deep tendon reflexes were 2+ and symmetric [No Tremors] : no tremors [Oriented x3] : oriented to person, place, and time [Acanthosis Nigricans] : no acanthosis nigricans

## 2020-07-20 ENCOUNTER — APPOINTMENT (OUTPATIENT)
Dept: FAMILY MEDICINE | Facility: CLINIC | Age: 45
End: 2020-07-20
Payer: MEDICARE

## 2020-07-20 PROCEDURE — 99441: CPT | Mod: 95

## 2020-07-31 ENCOUNTER — APPOINTMENT (OUTPATIENT)
Dept: CARDIOLOGY | Facility: CLINIC | Age: 45
End: 2020-07-31

## 2020-07-31 NOTE — ED CDU PROVIDER INITIAL DAY NOTE - PSH
Lactation Prenatal Consult:    LC to room, reviewed NICU breastfeeding guide and what to expect after delivery of twins. Discussed pumping soon after birth, importance of hand expression, and pumping in NICU. Pt has 3 pumps for home use. Also discussed Rental pump option. Answered all pt's questions.    No significant past surgical history

## 2020-08-12 ENCOUNTER — RX RENEWAL (OUTPATIENT)
Age: 45
End: 2020-08-12

## 2020-09-20 ENCOUNTER — EMERGENCY (EMERGENCY)
Facility: HOSPITAL | Age: 45
LOS: 1 days | Discharge: HOME | End: 2020-09-20
Attending: EMERGENCY MEDICINE
Payer: MEDICARE

## 2020-09-20 VITALS
TEMPERATURE: 99 F | HEIGHT: 63 IN | HEART RATE: 91 BPM | OXYGEN SATURATION: 94 % | WEIGHT: 293 LBS | SYSTOLIC BLOOD PRESSURE: 120 MMHG | DIASTOLIC BLOOD PRESSURE: 88 MMHG | RESPIRATION RATE: 22 BRPM

## 2020-09-20 PROCEDURE — 99285 EMERGENCY DEPT VISIT HI MDM: CPT | Mod: 25

## 2020-09-20 PROCEDURE — 71045 X-RAY EXAM CHEST 1 VIEW: CPT

## 2020-09-20 PROCEDURE — 71045 X-RAY EXAM CHEST 1 VIEW: CPT | Mod: 26

## 2020-09-20 PROCEDURE — 99284 EMERGENCY DEPT VISIT MOD MDM: CPT

## 2020-09-20 PROCEDURE — 94640 AIRWAY INHALATION TREATMENT: CPT

## 2020-09-20 RX ORDER — IPRATROPIUM/ALBUTEROL SULFATE 18-103MCG
3 AEROSOL WITH ADAPTER (GRAM) INHALATION ONCE
Refills: 0 | Status: COMPLETED | OUTPATIENT
Start: 2020-09-20 | End: 2020-09-20

## 2020-09-20 RX ORDER — ALBUTEROL 90 UG/1
3 AEROSOL, METERED ORAL
Qty: 45 | Refills: 0
Start: 2020-09-20 | End: 2020-09-24

## 2020-09-20 RX ORDER — ALBUTEROL 90 UG/1
2.5 AEROSOL, METERED ORAL ONCE
Refills: 0 | Status: COMPLETED | OUTPATIENT
Start: 2020-09-20 | End: 2020-09-20

## 2020-09-20 RX ORDER — ALBUTEROL 90 UG/1
2 AEROSOL, METERED ORAL
Qty: 1 | Refills: 0
Start: 2020-09-20 | End: 2020-09-24

## 2020-09-20 RX ADMIN — Medication 3 MILLILITER(S): at 21:21

## 2020-09-20 RX ADMIN — ALBUTEROL 2.5 MILLIGRAM(S): 90 AEROSOL, METERED ORAL at 20:53

## 2020-09-20 RX ADMIN — ALBUTEROL 2.5 MILLIGRAM(S): 90 AEROSOL, METERED ORAL at 20:38

## 2020-09-20 RX ADMIN — Medication 50 MILLIGRAM(S): at 20:38

## 2020-09-20 NOTE — ED PROVIDER NOTE - CLINICAL SUMMARY MEDICAL DECISION MAKING FREE TEXT BOX
feeling better no accessory muscle use no infiltrate on cxr steroids inhalers f/u pcp return to ed for intractable cp sobo r any overall worsening

## 2020-09-20 NOTE — ED ADULT TRIAGE NOTE - CHIEF COMPLAINT QUOTE
patient states for the past 2 days has been having SOB wheezing cough used Neb treatment not working well hx of asthma, DX with Bronchitis in July on ABX and steroids states that it has not gone away

## 2020-09-20 NOTE — ED PROVIDER NOTE - PATIENT PORTAL LINK FT
You can access the FollowMyHealth Patient Portal offered by [facility] by registering at the following website: [email or web address]. By joining [facility]’s FollowMyHealth portal, you will also be able to view your health information using other applications (apps) compatible with our system.

## 2020-09-20 NOTE — ED ADULT NURSE NOTE - CHIEF COMPLAINT
----- Message from Katiana Crews sent at 7/24/2020 12:10 PM CDT -----  Contact: Patient  Patient is requesting a call back   Her  tested positive for Covid and she would rosie to know should she be tested    Please call [phone]    
Pt is not having ANY symptoms, explained to her she does not meet testing requirments.  got tested on Monday and got results back today. They have been sleeping in separate beds. Explained to her about the mask, hand hygiene, social distancing. Pt is asking if we can order the antibody testing for her? Said she thinks she might have had Covid back in January.   
The patient is a 45y Female complaining of asthma exacerbation.

## 2020-09-20 NOTE — ED ADULT NURSE NOTE - OBJECTIVE STATEMENT
Pt presents to Ed A&Ox4 in NAD. audibile wheeze noted. pt with hx of asthma, bipolar, and DM. pt recently dx with bronchitis and was being treated outpt with Augmentin however has had no relief. pt taking OTC nasal spray and using prescribed inhalers without relief.

## 2020-09-20 NOTE — ED PROVIDER NOTE - OBJECTIVE STATEMENT
44 y/o F pt with hx of asthma, bipolar disorder, and DM presents to ED c/o productive cough, wheezing, and L ear pain that began 3 weeks ago. Pt states she went to urgent care 3 weeks ago and was treated for bronchitis with Augmentin but states she never fully recovered. Pt has been using nebulizer treatments and sinus nasal sprays with minimal relief. Pt states cough is productive with yellow and green sputum. denies fever. denies HA or neck pain. no chest pain. no abd pain. no n/v/d. no urinary f/u/d. no back pain. no motor or sensory deficits. denies illicit drug use. no recent travel. no rash. no other acute issues symptoms or concerns 44 y/o F pt with hx of asthma, bipolar disorder, and DM presents to ED c/o productive cough, wheezing, and L ear pain that began 3 weeks ago. Pt states she went to urgent care 3 weeks ago and was treated for bronchitis with Augmentin but states she never fully recovered. Pt has been using nebulizer treatments and sinus nasal sprays with minimal relief. Pt states cough is productive with yellow and green sputum. Pt is a current smoker. denies fever. denies HA or neck pain. no chest pain. no abd pain. no n/v/d. no urinary f/u/d. no back pain. no motor or sensory deficits. denies illicit drug use. no recent travel. no rash. no other acute issues symptoms or concerns

## 2020-09-20 NOTE — ED PROVIDER NOTE - ENMT, MLM
Airway patent, Nasal mucosa clear. Mouth with normal mucosa. TM's clear GUERO, no erythema. Throat has no vesicles, no oropharyngeal exudates and uvula is midline.

## 2020-09-20 NOTE — ED PROVIDER NOTE - SKIN NEGATIVE STATEMENT, MLM
Lot # (Optional): [id number] Lot # (Optional): [id number] no abrasions, no jaundice, no lesions, no pruritis, and no rashes.

## 2020-09-22 RX ORDER — AZITHROMYCIN 500 MG/1
1 TABLET, FILM COATED ORAL
Qty: 5 | Refills: 0
Start: 2020-09-22 | End: 2020-09-26

## 2020-10-07 ENCOUNTER — APPOINTMENT (OUTPATIENT)
Dept: PULMONOLOGY | Facility: CLINIC | Age: 45
End: 2020-10-07
Payer: MEDICARE

## 2020-10-07 VITALS
RESPIRATION RATE: 17 BRPM | OXYGEN SATURATION: 97 % | HEART RATE: 102 BPM | HEIGHT: 63 IN | BODY MASS INDEX: 51.91 KG/M2 | WEIGHT: 293 LBS | SYSTOLIC BLOOD PRESSURE: 130 MMHG | DIASTOLIC BLOOD PRESSURE: 60 MMHG

## 2020-10-07 DIAGNOSIS — Z87.891 PERSONAL HISTORY OF NICOTINE DEPENDENCE: ICD-10-CM

## 2020-10-07 DIAGNOSIS — F43.10 POST-TRAUMATIC STRESS DISORDER, UNSPECIFIED: ICD-10-CM

## 2020-10-07 DIAGNOSIS — Z23 ENCOUNTER FOR IMMUNIZATION: ICD-10-CM

## 2020-10-07 PROCEDURE — 99204 OFFICE O/P NEW MOD 45 MIN: CPT | Mod: 25

## 2020-10-07 PROCEDURE — 90686 IIV4 VACC NO PRSV 0.5 ML IM: CPT

## 2020-10-07 PROCEDURE — G0008: CPT

## 2020-10-07 PROCEDURE — 99406 BEHAV CHNG SMOKING 3-10 MIN: CPT

## 2020-10-09 ENCOUNTER — APPOINTMENT (OUTPATIENT)
Dept: CARDIOLOGY | Facility: CLINIC | Age: 45
End: 2020-10-09
Payer: MEDICARE

## 2020-10-09 VITALS
HEIGHT: 63 IN | TEMPERATURE: 98.2 F | SYSTOLIC BLOOD PRESSURE: 134 MMHG | BODY MASS INDEX: 51.91 KG/M2 | HEART RATE: 100 BPM | WEIGHT: 293 LBS | OXYGEN SATURATION: 96 % | RESPIRATION RATE: 16 BRPM | DIASTOLIC BLOOD PRESSURE: 72 MMHG

## 2020-10-09 VITALS — SYSTOLIC BLOOD PRESSURE: 130 MMHG | DIASTOLIC BLOOD PRESSURE: 78 MMHG

## 2020-10-09 DIAGNOSIS — Z87.898 PERSONAL HISTORY OF OTHER SPECIFIED CONDITIONS: ICD-10-CM

## 2020-10-09 DIAGNOSIS — Z87.09 PERSONAL HISTORY OF OTHER DISEASES OF THE RESPIRATORY SYSTEM: ICD-10-CM

## 2020-10-09 PROCEDURE — 93000 ELECTROCARDIOGRAM COMPLETE: CPT

## 2020-10-09 PROCEDURE — 99203 OFFICE O/P NEW LOW 30 MIN: CPT

## 2020-10-09 RX ORDER — TIOTROPIUM BROMIDE 18 UG/1
18 CAPSULE ORAL; RESPIRATORY (INHALATION)
Refills: 0 | Status: DISCONTINUED | COMMUNITY
End: 2020-10-09

## 2020-10-09 RX ORDER — CLONAZEPAM 0.5 MG/1
0.5 TABLET ORAL TWICE DAILY
Refills: 0 | Status: ACTIVE | COMMUNITY

## 2020-10-09 RX ORDER — HYDROXYZINE PAMOATE 50 MG/1
50 CAPSULE ORAL DAILY
Refills: 0 | Status: ACTIVE | COMMUNITY

## 2020-10-09 RX ORDER — TRAZODONE HYDROCHLORIDE 100 MG/1
100 TABLET ORAL
Refills: 0 | Status: ACTIVE | COMMUNITY

## 2020-10-09 NOTE — REVIEW OF SYSTEMS
[see HPI] : see HPI [Shortness Of Breath] : shortness of breath [Dyspnea on exertion] : dyspnea during exertion [Chest  Pressure] : no chest pressure [Chest Pain] : no chest pain [Lower Ext Edema] : no extremity edema [Palpitations] : no palpitations [Negative] : Neurological

## 2020-10-09 NOTE — HISTORY OF PRESENT ILLNESS
[FreeTextEntry1] : 45F hx morbid obesity, asthma, Htn, Hld, DM2, asthma referred for evaluation of dyspnea on exertion.\par \par Dyspnea on exertion present for many months.  Was seeing a bariatric surgeon for possible surgery who recommended she have pulmonary and cardiac evaluations.  Dyspnea prior to that point was not significant enough for her to want to seek further evaluation.  Seen in office today she feels well.\par \par Dyspnea is worse with exertion and better with rest, and at times associated with chest discomfort.  No lower extremity edema, orthopnea, palpitations, or syncope.  No prior cardiac testing.

## 2020-10-09 NOTE — PHYSICAL EXAM
[General Appearance - Well Developed] : well developed [Normal Appearance] : normal appearance [Well Groomed] : well groomed [General Appearance - Well Nourished] : well nourished [No Deformities] : no deformities [General Appearance - In No Acute Distress] : no acute distress [Normal Conjunctiva] : the conjunctiva exhibited no abnormalities [Eyelids - No Xanthelasma] : the eyelids demonstrated no xanthelasmas [Normal Oral Mucosa] : normal oral mucosa [No Oral Pallor] : no oral pallor [No Oral Cyanosis] : no oral cyanosis [Normal Jugular Venous A Waves Present] : normal jugular venous A waves present [Normal Jugular Venous V Waves Present] : normal jugular venous V waves present [No Jugular Venous Vazquez A Waves] : no jugular venous vazquez A waves [Heart Rate And Rhythm] : heart rate and rhythm were normal [Heart Sounds] : normal S1 and S2 [Murmurs] : no murmurs present [FreeTextEntry1] : distant heart sounds [Respiration, Rhythm And Depth] : normal respiratory rhythm and effort [Exaggerated Use Of Accessory Muscles For Inspiration] : no accessory muscle use [Auscultation Breath Sounds / Voice Sounds] : lungs were clear to auscultation bilaterally [Abdomen Soft] : soft [Abdomen Tenderness] : non-tender [Abdomen Mass (___ Cm)] : no abdominal mass palpated [] : no rash [Oriented To Time, Place, And Person] : oriented to person, place, and time [Affect] : the affect was normal

## 2020-10-10 ENCOUNTER — RX RENEWAL (OUTPATIENT)
Age: 45
End: 2020-10-10

## 2020-10-14 ENCOUNTER — NON-APPOINTMENT (OUTPATIENT)
Age: 45
End: 2020-10-14

## 2020-10-19 ENCOUNTER — APPOINTMENT (OUTPATIENT)
Dept: DISASTER EMERGENCY | Facility: CLINIC | Age: 45
End: 2020-10-19

## 2020-10-21 LAB — SARS-COV-2 N GENE NPH QL NAA+PROBE: NOT DETECTED

## 2020-10-23 ENCOUNTER — NON-APPOINTMENT (OUTPATIENT)
Age: 45
End: 2020-10-23

## 2020-10-23 ENCOUNTER — APPOINTMENT (OUTPATIENT)
Dept: PULMONOLOGY | Facility: CLINIC | Age: 45
End: 2020-10-23
Payer: MEDICARE

## 2020-10-23 VITALS — WEIGHT: 293 LBS | BODY MASS INDEX: 62.89 KG/M2

## 2020-10-23 PROCEDURE — 94729 DIFFUSING CAPACITY: CPT

## 2020-10-23 PROCEDURE — 85018 HEMOGLOBIN: CPT | Mod: QW

## 2020-10-23 PROCEDURE — 94010 BREATHING CAPACITY TEST: CPT

## 2020-10-27 ENCOUNTER — OUTPATIENT (OUTPATIENT)
Dept: OUTPATIENT SERVICES | Facility: HOSPITAL | Age: 45
LOS: 1 days | End: 2020-10-27
Payer: MEDICARE

## 2020-10-27 DIAGNOSIS — G47.33 OBSTRUCTIVE SLEEP APNEA (ADULT) (PEDIATRIC): ICD-10-CM

## 2020-10-27 PROCEDURE — 95810 POLYSOM 6/> YRS 4/> PARAM: CPT | Mod: 26

## 2020-10-27 PROCEDURE — 95810 POLYSOM 6/> YRS 4/> PARAM: CPT

## 2020-11-03 ENCOUNTER — APPOINTMENT (OUTPATIENT)
Dept: CARDIOLOGY | Facility: CLINIC | Age: 45
End: 2020-11-03
Payer: MEDICARE

## 2020-11-03 PROCEDURE — 93306 TTE W/DOPPLER COMPLETE: CPT

## 2020-11-05 ENCOUNTER — APPOINTMENT (OUTPATIENT)
Dept: CARDIOLOGY | Facility: CLINIC | Age: 45
End: 2020-11-05
Payer: MEDICARE

## 2020-11-05 PROCEDURE — 78452 HT MUSCLE IMAGE SPECT MULT: CPT

## 2020-11-05 PROCEDURE — 93015 CV STRESS TEST SUPVJ I&R: CPT

## 2020-11-05 PROCEDURE — A9500: CPT

## 2020-11-11 ENCOUNTER — APPOINTMENT (OUTPATIENT)
Dept: GASTROENTEROLOGY | Facility: CLINIC | Age: 45
End: 2020-11-11
Payer: MEDICARE

## 2020-11-11 VITALS
HEIGHT: 63 IN | TEMPERATURE: 97.4 F | WEIGHT: 293 LBS | SYSTOLIC BLOOD PRESSURE: 130 MMHG | OXYGEN SATURATION: 96 % | RESPIRATION RATE: 17 BRPM | HEART RATE: 93 BPM | DIASTOLIC BLOOD PRESSURE: 101 MMHG | BODY MASS INDEX: 51.91 KG/M2

## 2020-11-11 DIAGNOSIS — Z86.79 PERSONAL HISTORY OF OTHER DISEASES OF THE CIRCULATORY SYSTEM: ICD-10-CM

## 2020-11-11 DIAGNOSIS — Z80.0 FAMILY HISTORY OF MALIGNANT NEOPLASM OF DIGESTIVE ORGANS: ICD-10-CM

## 2020-11-11 DIAGNOSIS — Z78.9 OTHER SPECIFIED HEALTH STATUS: ICD-10-CM

## 2020-11-11 DIAGNOSIS — Z87.19 PERSONAL HISTORY OF OTHER DISEASES OF THE DIGESTIVE SYSTEM: ICD-10-CM

## 2020-11-11 PROCEDURE — 99204 OFFICE O/P NEW MOD 45 MIN: CPT

## 2020-11-11 NOTE — ASSESSMENT
[FreeTextEntry1] : The patient's daily heartburn is clearly related to her morbid obesity. She will require an upper endoscopy prior to any bariatric surgery which will be scheduled at that time I can also investigate the possibility of underlying esophagitis or Merino's esophagus. 2 obtain a CBC, basic metabolic profile, prothrombin time and celiac antibodies prior to the examination. Therapy will depend upon the results. The risks, benefits, complications and possible adverse consequences associated with upper endoscopy were discussed with the patient.\par

## 2020-11-11 NOTE — HISTORY OF PRESENT ILLNESS
[de-identified] : The patient is referred for preop bariatric surgical evaluation. She is morbidly obese. She also complains of chronic daily heartburn for which she will take TUMS with some relief. There is no dysphagia. There is no abdominal pain, nausea or vomiting. She has obstructive sleep apnea and dyspnea on exertion.

## 2020-11-11 NOTE — PHYSICAL EXAM
[General Appearance - Alert] : alert [General Appearance - In No Acute Distress] : in no acute distress [General Appearance - Well Nourished] : well nourished [General Appearance - Well Developed] : well developed [General Appearance - Well-Appearing] : healthy appearing [Sclera] : the sclera and conjunctiva were normal [PERRL With Normal Accommodation] : pupils were equal in size, round, and reactive to light [Extraocular Movements] : extraocular movements were intact [Outer Ear] : the ears and nose were normal in appearance [Hearing Threshold Finger Rub Not Allen] : hearing was normal [Examination Of The Oral Cavity] : the lips and gums were normal [Neck Appearance] : the appearance of the neck was normal [Auscultation Breath Sounds / Voice Sounds] : lungs were clear to auscultation bilaterally [Heart Rate And Rhythm] : heart rate was normal and rhythm regular [Heart Sounds] : normal S1 and S2 [Heart Sounds Gallop] : no gallops [Murmurs] : no murmurs [Heart Sounds Pericardial Friction Rub] : no pericardial rub [Bowel Sounds] : normal bowel sounds [Abdomen Soft] : soft [Abdomen Tenderness] : non-tender [Abdomen Mass (___ Cm)] : no abdominal mass palpated [No CVA Tenderness] : no ~M costovertebral angle tenderness [No Spinal Tenderness] : no spinal tenderness [Abnormal Walk] : normal gait [Nail Clubbing] : no clubbing  or cyanosis of the fingernails [Musculoskeletal - Swelling] : no joint swelling seen [Motor Tone] : muscle strength and tone were normal [Skin Color & Pigmentation] : normal skin color and pigmentation [Skin Turgor] : normal skin turgor [] : no rash [Motor Exam] : the motor exam was normal [No Focal Deficits] : no focal deficits [Oriented To Time, Place, And Person] : oriented to person, place, and time [Impaired Insight] : insight and judgment were intact [Affect] : the affect was normal [FreeTextEntry1] : morbidly obese

## 2020-11-11 NOTE — ED CDU PROVIDER INITIAL DAY NOTE - EYES, MLM
The patient is a 37 year old /Black female seen by Dr. Penn in Feb for rectal bleeding. She was diagnosed with anemia about 2 years ago, ferritin 8. She had fibroids s/p myomecty last year. No recent  labs but presumed anemia was from the fibroids. She does have chronic fatigue. She notes pulsing in her abd area X 3 weeks, mostly in the lower abdomen, occuring since eating beef. She has been having loose bowels for the last 3 weeks also since eating beef, at times lighter-colored, on average 1-2/day. Mild nausea but no vomiting. + 5# weight loss as scared eating will worsen her sx. She was told she has hemm and notes BRB ever 2 days in the stool or toilet bowl. Associated rectal itching. She also has RUQ pain at times, without agg sx, at times with nausea.  Grandfather with Colon CA
Clear bilaterally, pupils equal, round and reactive to light.

## 2020-11-12 ENCOUNTER — APPOINTMENT (OUTPATIENT)
Dept: CARDIOLOGY | Facility: CLINIC | Age: 45
End: 2020-11-12

## 2020-11-12 ENCOUNTER — APPOINTMENT (OUTPATIENT)
Dept: PULMONOLOGY | Facility: CLINIC | Age: 45
End: 2020-11-12
Payer: MEDICARE

## 2020-11-12 VITALS
HEART RATE: 88 BPM | WEIGHT: 293 LBS | DIASTOLIC BLOOD PRESSURE: 80 MMHG | BODY MASS INDEX: 62.18 KG/M2 | SYSTOLIC BLOOD PRESSURE: 142 MMHG | OXYGEN SATURATION: 96 %

## 2020-11-12 PROCEDURE — 99214 OFFICE O/P EST MOD 30 MIN: CPT

## 2020-11-12 RX ORDER — PEN NEEDLE, DIABETIC 29 G X1/2"
31G X 5 MM NEEDLE, DISPOSABLE MISCELLANEOUS
Qty: 100 | Refills: 0 | Status: ACTIVE | COMMUNITY
Start: 2020-08-12

## 2020-11-12 RX ORDER — BUDESONIDE AND FORMOTEROL FUMARATE DIHYDRATE 160; 4.5 UG/1; UG/1
160-4.5 AEROSOL RESPIRATORY (INHALATION)
Refills: 0 | Status: DISCONTINUED | COMMUNITY
End: 2020-11-12

## 2020-11-12 RX ORDER — AZELASTINE HYDROCHLORIDE 137 UG/1
0.1 SPRAY, METERED NASAL
Qty: 30 | Refills: 0 | Status: ACTIVE | COMMUNITY
Start: 2020-10-08

## 2020-11-12 RX ORDER — AMOXICILLIN 500 MG/1
500 CAPSULE ORAL
Qty: 21 | Refills: 0 | Status: DISCONTINUED | COMMUNITY
Start: 2020-08-20 | End: 2020-11-12

## 2020-11-12 RX ORDER — ALBUTEROL 90 MCG
90 AEROSOL (GRAM) INHALATION
Refills: 0 | Status: DISCONTINUED | COMMUNITY
End: 2020-11-12

## 2020-11-12 RX ORDER — SIMVASTATIN 20 MG/1
20 TABLET, FILM COATED ORAL
Qty: 30 | Refills: 0 | Status: DISCONTINUED | COMMUNITY
Start: 2020-11-02 | End: 2020-11-12

## 2020-11-12 RX ORDER — OFLOXACIN OTIC 3 MG/ML
0.3 SOLUTION AURICULAR (OTIC)
Qty: 10 | Refills: 0 | Status: DISCONTINUED | COMMUNITY
Start: 2020-07-21 | End: 2020-11-12

## 2020-11-12 RX ORDER — PEN NEEDLE, DIABETIC 32 GX 1/4"
32G X 6 MM NEEDLE, DISPOSABLE MISCELLANEOUS
Qty: 400 | Refills: 0 | Status: ACTIVE | COMMUNITY
Start: 2020-02-19

## 2020-11-12 RX ORDER — AMOXICILLIN AND CLAVULANATE POTASSIUM 875; 125 MG/1; MG/1
875-125 TABLET, COATED ORAL
Qty: 20 | Refills: 0 | Status: DISCONTINUED | COMMUNITY
Start: 2020-07-20 | End: 2020-11-12

## 2020-11-12 RX ORDER — LANCETS 28 GAUGE
EACH MISCELLANEOUS
Qty: 4 | Refills: 0 | Status: ACTIVE | COMMUNITY
Start: 2020-03-30

## 2020-11-12 RX ORDER — IPRATROPIUM BROMIDE AND ALBUTEROL SULFATE 2.5; .5 MG/3ML; MG/3ML
0.5-2.5 (3) SOLUTION RESPIRATORY (INHALATION)
Refills: 0 | Status: DISCONTINUED | COMMUNITY
End: 2020-11-12

## 2020-11-12 RX ORDER — ALBUTEROL SULFATE 90 UG/1
108 (90 BASE) INHALANT RESPIRATORY (INHALATION)
Qty: 1 | Refills: 3 | Status: ACTIVE | COMMUNITY
Start: 2020-11-12 | End: 1900-01-01

## 2020-11-12 RX ORDER — FLUCONAZOLE 150 MG/1
150 TABLET ORAL
Qty: 2 | Refills: 0 | Status: DISCONTINUED | COMMUNITY
Start: 2020-07-21 | End: 2020-11-12

## 2020-11-12 RX ORDER — LAMOTRIGINE 100 MG/1
100 TABLET, EXTENDED RELEASE ORAL
Refills: 0 | Status: DISCONTINUED | COMMUNITY
End: 2020-11-12

## 2020-11-12 RX ORDER — ALBUTEROL SULFATE 90 UG/1
108 (90 BASE) INHALANT RESPIRATORY (INHALATION)
Qty: 8 | Refills: 0 | Status: DISCONTINUED | COMMUNITY
Start: 2020-10-08 | End: 2020-11-12

## 2020-11-12 RX ORDER — AZITHROMYCIN 250 MG/1
250 TABLET, FILM COATED ORAL
Qty: 6 | Refills: 0 | Status: DISCONTINUED | COMMUNITY
Start: 2020-09-22 | End: 2020-11-12

## 2020-11-12 RX ORDER — LANCETS
EACH MISCELLANEOUS
Qty: 400 | Refills: 0 | Status: ACTIVE | COMMUNITY
Start: 2020-02-24

## 2020-11-12 RX ORDER — AZITHROMYCIN 1 G/1
1 POWDER, FOR SUSPENSION ORAL
Qty: 1 | Refills: 0 | Status: DISCONTINUED | COMMUNITY
Start: 2020-09-01 | End: 2020-11-12

## 2020-11-12 RX ORDER — PREDNISONE 20 MG/1
20 TABLET ORAL
Qty: 10 | Refills: 0 | Status: DISCONTINUED | COMMUNITY
Start: 2020-09-20 | End: 2020-11-12

## 2020-11-17 ENCOUNTER — APPOINTMENT (OUTPATIENT)
Dept: CARDIOLOGY | Facility: CLINIC | Age: 45
End: 2020-11-17
Payer: MEDICARE

## 2020-11-17 VITALS
WEIGHT: 293 LBS | DIASTOLIC BLOOD PRESSURE: 77 MMHG | HEART RATE: 81 BPM | TEMPERATURE: 97.5 F | BODY MASS INDEX: 62.89 KG/M2 | SYSTOLIC BLOOD PRESSURE: 128 MMHG | OXYGEN SATURATION: 96 %

## 2020-11-17 PROCEDURE — 99213 OFFICE O/P EST LOW 20 MIN: CPT

## 2020-11-17 NOTE — REASON FOR VISIT
[Follow-Up - Clinic] : a clinic follow-up of [Dyspnea] : dyspnea [FreeTextEntry1] : Preoperative cardiac risk assessment

## 2020-11-17 NOTE — PHYSICAL EXAM
[General Appearance - Well Developed] : well developed [Normal Appearance] : normal appearance [Well Groomed] : well groomed [General Appearance - Well Nourished] : well nourished [No Deformities] : no deformities [General Appearance - In No Acute Distress] : no acute distress [Normal Conjunctiva] : the conjunctiva exhibited no abnormalities [Eyelids - No Xanthelasma] : the eyelids demonstrated no xanthelasmas [Normal Oral Mucosa] : normal oral mucosa [No Oral Pallor] : no oral pallor [No Oral Cyanosis] : no oral cyanosis [Normal Jugular Venous A Waves Present] : normal jugular venous A waves present [Normal Jugular Venous V Waves Present] : normal jugular venous V waves present [No Jugular Venous Vazquez A Waves] : no jugular venous vazquez A waves [Respiration, Rhythm And Depth] : normal respiratory rhythm and effort [Exaggerated Use Of Accessory Muscles For Inspiration] : no accessory muscle use [Auscultation Breath Sounds / Voice Sounds] : lungs were clear to auscultation bilaterally [Heart Rate And Rhythm] : heart rate and rhythm were normal [Heart Sounds] : normal S1 and S2 [Murmurs] : no murmurs present [Abdomen Soft] : soft [Abdomen Tenderness] : non-tender [Abdomen Mass (___ Cm)] : no abdominal mass palpated [] : no rash [Oriented To Time, Place, And Person] : oriented to person, place, and time [Affect] : the affect was normal [FreeTextEntry1] : distant heart sounds

## 2020-11-17 NOTE — HISTORY OF PRESENT ILLNESS
[FreeTextEntry1] : 45F hx morbid obesity, asthma, Htn, Hld, DM2, asthma referred for evaluation of dyspnea on exertion.\par \par Dyspnea on exertion present for many months.  Was seeing a bariatric surgeon for possible surgery who recommended she have pulmonary and cardiac evaluations.  \par \par Update 11/17/20:\par Completed echo and NST, presents today for results review.  Diagnosed with LUDIN, referred by Pulm to have CPAP titration study.  No definite date for bariatric surgery yet.  Stopped smoking and is trying to eat healthier.  Still has dyspnea, but severity has improved since previous cardiac assessment.  No new or additional issues office today.

## 2020-11-17 NOTE — REVIEW OF SYSTEMS
[see HPI] : see HPI [Shortness Of Breath] : shortness of breath [Dyspnea on exertion] : dyspnea during exertion [Negative] : Neurological [Chest  Pressure] : no chest pressure [Chest Pain] : no chest pain [Lower Ext Edema] : no extremity edema [Palpitations] : no palpitations

## 2020-11-18 LAB
ANION GAP SERPL CALC-SCNC: 13 MMOL/L
BASOPHILS # BLD AUTO: 0.03 K/UL
BASOPHILS NFR BLD AUTO: 0.5 %
BUN SERPL-MCNC: 8 MG/DL
CALCIUM SERPL-MCNC: 9 MG/DL
CHLORIDE SERPL-SCNC: 101 MMOL/L
CO2 SERPL-SCNC: 24 MMOL/L
CREAT SERPL-MCNC: 0.75 MG/DL
EOSINOPHIL # BLD AUTO: 0.01 K/UL
EOSINOPHIL NFR BLD AUTO: 0.2 %
GLUCOSE SERPL-MCNC: 208 MG/DL
HCT VFR BLD CALC: 43.9 %
HGB BLD-MCNC: 13.9 G/DL
IGA SER QL IEP: 224 MG/DL
IMM GRANULOCYTES NFR BLD AUTO: 0.3 %
INR PPP: 1.02 RATIO
LYMPHOCYTES # BLD AUTO: 2.18 K/UL
LYMPHOCYTES NFR BLD AUTO: 36.1 %
MAN DIFF?: NORMAL
MCHC RBC-ENTMCNC: 27.4 PG
MCHC RBC-ENTMCNC: 31.7 GM/DL
MCV RBC AUTO: 86.4 FL
MONOCYTES # BLD AUTO: 0.48 K/UL
MONOCYTES NFR BLD AUTO: 7.9 %
NEUTROPHILS # BLD AUTO: 3.32 K/UL
NEUTROPHILS NFR BLD AUTO: 55 %
PLATELET # BLD AUTO: 198 K/UL
POTASSIUM SERPL-SCNC: 4.5 MMOL/L
PT BLD: 12 SEC
RBC # BLD: 5.08 M/UL
RBC # FLD: 14.1 %
SODIUM SERPL-SCNC: 138 MMOL/L
WBC # FLD AUTO: 6.04 K/UL

## 2020-11-19 ENCOUNTER — APPOINTMENT (OUTPATIENT)
Dept: FAMILY MEDICINE | Facility: CLINIC | Age: 45
End: 2020-11-19
Payer: MEDICARE

## 2020-11-19 VITALS
TEMPERATURE: 96.9 F | HEART RATE: 88 BPM | OXYGEN SATURATION: 98 % | DIASTOLIC BLOOD PRESSURE: 76 MMHG | RESPIRATION RATE: 16 BRPM | SYSTOLIC BLOOD PRESSURE: 122 MMHG | WEIGHT: 293 LBS | HEIGHT: 60 IN | BODY MASS INDEX: 57.52 KG/M2

## 2020-11-19 PROCEDURE — G0447 BEHAVIOR COUNSEL OBESITY 15M: CPT | Mod: 59

## 2020-11-19 PROCEDURE — G0439: CPT

## 2020-11-19 PROCEDURE — 99406 BEHAV CHNG SMOKING 3-10 MIN: CPT | Mod: 25

## 2020-11-19 NOTE — HEALTH RISK ASSESSMENT
[Fair] :  ~his/her~ mood as fair [Intercurrent ED visits] : went to ED [] : Yes [30 or more] : 30 or more [No falls in past year] : Patient reported no falls in the past year [0] : 1) Little interest or pleasure doing things: Not at all (0) [1] : 2) Feeling down, depressed, or hopeless for several days (1) [Patient reported mammogram was normal] : Patient reported mammogram was normal [Patient reported PAP Smear was normal] : Patient reported PAP Smear was normal [Housing] : housing [With Family] : lives with family [Unemployed] : unemployed [College] : College [Single] : single [# Of Children ___] : has [unfilled] children [Feels Safe at Home] : Feels safe at home [Fully functional (bathing, dressing, toileting, transferring, walking, feeding)] : Fully functional (bathing, dressing, toileting, transferring, walking, feeding) [Fully functional (using the telephone, shopping, preparing meals, housekeeping, doing laundry, using] : Fully functional and needs no help or supervision to perform IADLs (using the telephone, shopping, preparing meals, housekeeping, doing laundry, using transportation, managing medications and managing finances) [Reports normal functional visual acuity (ie: able to read med bottle)] : Reports normal functional visual acuity [Smoke Detector] : smoke detector [Carbon Monoxide Detector] : carbon monoxide detector [Safety elements used in home] : safety elements used in home [Seat Belt] :  uses seat belt [Sunscreen] : uses sunscreen [With Patient/Caregiver] : With Patient/Caregiver [No] : In the past 12 months have you used drugs other than those required for medical reasons? No [de-identified] : walking [de-identified] : Trying to watch diet [Change in mental status noted] : No change in mental status noted [Language] : denies difficulty with language [Behavior] : denies difficulty with behavior [Learning/Retaining New Information] : denies difficulty learning/retaining new information [Handling Complex Tasks] : denies difficulty handling complex tasks [Reasoning] : denies difficulty with reasoning [Spatial Ability and Orientation] : denies difficulty with spatial ability and orientation [Sexually Active] : not sexually active [High Risk Behavior] : no high risk behavior [Reports changes in hearing] : Reports no changes in hearing [Reports changes in vision] : Reports no changes in vision [Reports changes in dental health] : Reports no changes in dental health [TB Exposure] : is not being exposed to tuberculosis [Caregiver Concerns] : does not have caregiver concerns [MammogramDate] : 10/2020 [PapSmearDate] : 2020 [ColonoscopyComments] : schedule for colonoscopy on 11/27/2020 [HIVDate] : 2020 [HIVComments] : negative per patient [HepatitisCDate] : 2020 [HepatitisCComments] : negative per patient [de-identified] : 5 [de-identified] : AS in early childhood [AdvancecareDate] : 11/19.2020

## 2020-11-19 NOTE — PLAN
[FreeTextEntry1] : Annual Physical Exam:  Done\par \par DM:  Continue with medications,  monitor BS, bring records next visit.  continue to f/u with endocrinologist. \par \par Dyslipidemia:  Simvastatin 10 mg every evening.  Discussed diet and exercise. \par \par Asthma:  Continue with symbicort 2 puffs twice/day,  albuterol hfa every 4 hours prn.  use nebulizer treatment as directed.  Avoid mold, dust.  Continue to f/u with pulmonologist.  \par \par Obesity:  Discussed the risk of obesity and the benefit of loosing weight.  Encouraged to eat smaller portions 3-4 times/day,  keep food diary, weigh self weekly.\par \par Tobacco used:  Smoking cessation counseling given.  Discussed the risk of tobacco used, and the benefits of smoking cessation.  encouraged to go to smoking cessation classes.  Information given with Phone numbers and locations for smoking cessation classes. \par \par HM: discussed LIfe style modification, healthy diet, low salts, fats, sweets, less juices/soda/fried food, cheese, butter.  More water, fruits, vegetables.   Discussed the risk of obesity and the benefit of loosing weight.  Encouraged to eat smaller portions 3-4 times/day,  keep food diary, weigh self weekly.  Exercise, walking/running 30-60 minutes/day, move around more.  Encouraged to sleep 8 hours/night, get plenty of rest.  Discussed dental care, floss/brush after each meals, dental check every 6 months.  Annual eye check,  keep appointment with 12/9/2020.   Smoking cessation counseling given.  Discussed the risk of tobacco used, and the benefits of smoking cessation.  encouraged to go to smoking cessation classes.  Information given with Phone numbers and locations for smoking cessation classes.   Encouraged frequent hands washing, wear mask when going out/ when needed.  Continue to f/u with specialists.  Labs requisition:  cbc with diff, cmp, lipid, tsh, hgbA1c, vit D.\par \par Bipolar/Depression/Anxiety:  Continue to f/u with psych/therapist, continue to take medications.\par \par F/U in 10-12 weeks or prn

## 2020-11-19 NOTE — HISTORY OF PRESENT ILLNESS
[FreeTextEntry1] : Annual Physical Exam [de-identified] : 45 y.o female with PMHx of Obesity, Asthma, Diabetic, sleep apnea, dyslipidemia, bipolar.  Here today for Physical exam for weight loss surgery.  Reported gained a lot of weight, was seen by a surgeon for weight loss surgery,  need to have clearance from specialists.   Reported tring to watch diet, walking.  Also reported taking medications every day, took meds today.  Checking BS, did not bring record, stated ranging 120 to 170.  Denies polyuria, polyphagia, polydipsia.  No cp, palpitation, sob, dizziness, edema, n/v, headaches.

## 2020-11-19 NOTE — COUNSELING
[Behavioral health counseling provided] : Behavioral health counseling provided [Sleep ___ hours/day] : Sleep [unfilled] hours/day [Engage in a relaxing activity] : Engage in a relaxing activity [Plan in advance] : Plan in advance [Risk of tobacco use and health benefits of smoking cessation discussed] : Risk of tobacco use and health benefits of smoking cessation discussed [Cessation strategies including cessation program discussed] : Cessation strategies including cessation program discussed [Use of nicotine replacement therapies and other medications discussed] : Use of nicotine replacement therapies and other medications discussed [Encouraged to pick a quit date and identify support needed to quit] : Encouraged to pick a quit date and identify support needed to quit [Tobacco Use Cessation Intermediate Greater Than 3 Minutes Up to 10 Minutes] : Tobacco Use Cessation Intermediate Greater Than 3 Minutes Up to 10 Minutes [Potential consequences of obesity discussed] : Potential consequences of obesity discussed [Benefits of weight loss discussed] : Benefits of weight loss discussed [Structured Weight Management Program suggested:] : Structured weight management program suggested [Encouraged to maintain food diary] : Encouraged to maintain food diary [Encouraged to increase physical activity] : Encouraged to increase physical activity [Encouraged to use exercise tracking device] : Encouraged to use exercise tracking device [Target Wt Loss Goal ___] : Weight Loss Goals: Target weight loss goal [unfilled] lbs [Weigh Self Weekly] : weigh self weekly [Decrease Portions] : decrease portions [____ min/wk Activity] : [unfilled] min/wk activity [Keep Food Diary] : keep food diary [Patient motivation] : Patient motivation [Good understanding] : Patient has a good understanding of lifestyle changes and steps needed to achieve self management goal [FreeTextEntry1] : 5 [FreeTextEntry4] : 15

## 2020-11-19 NOTE — PHYSICAL EXAM
[Normal] : affect was normal and insight and judgment were intact [de-identified] : wears glasses [de-identified] : Large/obese

## 2020-11-19 NOTE — PAST MEDICAL HISTORY
[Menstruating] : menstruating [Definite ___ (Date)] : the last menstrual period was [unfilled] [Regular Cycle Intervals] : have been regular [Total Preg ___] : G[unfilled] [Full Term ___] : Full Term: [unfilled] [Living ___] : Living: [unfilled] [Live Births ___] : P[unfilled]

## 2020-11-20 LAB
GLIADIN IGA SER QL: <5 UNITS
GLIADIN IGG SER QL: <5 UNITS
GLIADIN PEPTIDE IGA SER-ACNC: NEGATIVE
GLIADIN PEPTIDE IGG SER-ACNC: NEGATIVE
TTG IGA SER IA-ACNC: <1.2 U/ML
TTG IGA SER-ACNC: NEGATIVE
TTG IGG SER IA-ACNC: 1.2 U/ML
TTG IGG SER IA-ACNC: NEGATIVE

## 2020-11-23 LAB
ENDOMYSIUM IGA SER QL: NEGATIVE
ENDOMYSIUM IGA TITR SER: NORMAL

## 2020-11-25 ENCOUNTER — APPOINTMENT (OUTPATIENT)
Dept: DISASTER EMERGENCY | Facility: CLINIC | Age: 45
End: 2020-11-25

## 2020-11-25 ENCOUNTER — LABORATORY RESULT (OUTPATIENT)
Age: 45
End: 2020-11-25

## 2020-11-30 ENCOUNTER — RX CHANGE (OUTPATIENT)
Age: 45
End: 2020-11-30

## 2020-11-30 ENCOUNTER — RX RENEWAL (OUTPATIENT)
Age: 45
End: 2020-11-30

## 2020-11-30 ENCOUNTER — OUTPATIENT (OUTPATIENT)
Dept: OUTPATIENT SERVICES | Facility: HOSPITAL | Age: 45
LOS: 1 days | End: 2020-11-30
Payer: MEDICARE

## 2020-11-30 DIAGNOSIS — G47.33 OBSTRUCTIVE SLEEP APNEA (ADULT) (PEDIATRIC): ICD-10-CM

## 2020-11-30 PROCEDURE — 95811 POLYSOM 6/>YRS CPAP 4/> PARM: CPT | Mod: 26

## 2020-11-30 PROCEDURE — 95811 POLYSOM 6/>YRS CPAP 4/> PARM: CPT

## 2020-12-06 ENCOUNTER — APPOINTMENT (OUTPATIENT)
Dept: DISASTER EMERGENCY | Facility: CLINIC | Age: 45
End: 2020-12-06

## 2020-12-07 LAB — SARS-COV-2 N GENE NPH QL NAA+PROBE: NOT DETECTED

## 2020-12-09 ENCOUNTER — APPOINTMENT (OUTPATIENT)
Dept: GASTROENTEROLOGY | Facility: HOSPITAL | Age: 45
End: 2020-12-09

## 2020-12-09 ENCOUNTER — RESULT REVIEW (OUTPATIENT)
Age: 45
End: 2020-12-09

## 2020-12-09 ENCOUNTER — OUTPATIENT (OUTPATIENT)
Dept: OUTPATIENT SERVICES | Facility: HOSPITAL | Age: 45
LOS: 1 days | End: 2020-12-09
Payer: MEDICARE

## 2020-12-09 DIAGNOSIS — R12 HEARTBURN: ICD-10-CM

## 2020-12-09 LAB
GLUCOSE BLDC GLUCOMTR-MCNC: 212 MG/DL — HIGH (ref 70–99)
HCG UR QL: NEGATIVE — SIGNIFICANT CHANGE UP

## 2020-12-09 PROCEDURE — 43239 EGD BIOPSY SINGLE/MULTIPLE: CPT

## 2020-12-09 PROCEDURE — 88342 IMHCHEM/IMCYTCHM 1ST ANTB: CPT | Mod: 26

## 2020-12-09 PROCEDURE — 88305 TISSUE EXAM BY PATHOLOGIST: CPT | Mod: 26

## 2020-12-09 PROCEDURE — 82962 GLUCOSE BLOOD TEST: CPT

## 2020-12-09 PROCEDURE — 88342 IMHCHEM/IMCYTCHM 1ST ANTB: CPT

## 2020-12-09 PROCEDURE — 81025 URINE PREGNANCY TEST: CPT

## 2020-12-09 PROCEDURE — 88305 TISSUE EXAM BY PATHOLOGIST: CPT

## 2020-12-09 NOTE — PROCEDURE
[GERD] : GERD [Procedure Explained] : The procedure was explained [Allergies Reviewed] : allergies reviewed. [Risks] : Risks [Benefits] : benefits [Alternatives] : alternatives [Consent Obtained] : written consent was obtained prior to the procedure and is detailed in the patient's record [Patient] : the patient [Automated Blood Pressure Cuff] : automated blood pressure cuff [Cardiac Monitor] : cardiac monitor [Pulse Oximeter] : pulse oximeter [Propofol ___ mg IV] : Propofol [unfilled] ~Umg intravenously [3] : 3 [Normal] : Normal [Sent to Pathology] : was sent to pathology for analysis [Tolerated Well] : the patient tolerated the procedure well [Vital Signs Stable] : the vital signs were stable [de-identified] : obesity, preop op bariatric surgery [de-identified] : biopsy taken for H Pylori [de-identified] : biopsy taken for H Pylori [de-identified] : call for path in week. Try famotidine 40mg PO BID

## 2020-12-14 LAB — SURGICAL PATHOLOGY STUDY: SIGNIFICANT CHANGE UP

## 2020-12-16 ENCOUNTER — APPOINTMENT (OUTPATIENT)
Dept: ENDOCRINOLOGY | Facility: CLINIC | Age: 45
End: 2020-12-16
Payer: MEDICARE

## 2020-12-16 DIAGNOSIS — I10 ESSENTIAL (PRIMARY) HYPERTENSION: ICD-10-CM

## 2020-12-16 PROCEDURE — 99214 OFFICE O/P EST MOD 30 MIN: CPT | Mod: 95

## 2020-12-16 NOTE — ASSESSMENT
[Carbohydrate Consistent Diet] : carbohydrate consistent diet [Exercise/Effect on Glucose] : exercise/effect on glucose [Weight Loss] : weight loss [FreeTextEntry1] : Dm2 newly diagnosed. Has COPD treated with steroids when acute and has bipolar disease in treatment with mood stabilizer.  She was diagnosed with severe LUDIN , now waiting for CPAP treatment to start. \par \par DM2  : moderately uncontrolled. \par bgs am 135-165   lunch = 132-172 dinner= 147 -170 bedtime 159- 202. \par continue MF 1000 mg BID \par increase basaglar to 44 u Hs \par increase novolog to 23  u TID w meals.  \par fax me logbook 1 week  \par encouraged more exercise walking 30 min 3 x week\par eats whole wheat pasta, bread, a lot of veggies. \par Importance of blood glucose monitoring discussed. Targets reviewed. Recommended pt try to keep blood glucose level below 130 (110) before meals and below 160 (140) two hours after meals.\par check microalb spot \par eye exam Nov 2019 but she still has blurry vision \par \par HTN: intermittently.  continue amlodipine  .She checks at home. continue to monitor 3x week and if again sBP 160's call pcp to let them know. \par I advised low fat/low cholesterol diet, low salt diet\par  \par HLD  : LDL at target.continue simvastatin \par low fat/low cholesterol diet and weight loss advised\par \par morbid obesity: lost 12 lbs\par  encouraged more exercise walking 30 min 3 x week \par \par Verbal consent obtained from patient for telemedicine encounter during COVID crisis.\par Discussed with patient given unable to provide physical exam, evaluation done on symptoms only. \par All lab results reviewed and discussed with patient. All questions answered\par This was a telehealth service rendered via interactive audio and video telecommunication\par  system.\par \par

## 2020-12-18 ENCOUNTER — APPOINTMENT (OUTPATIENT)
Dept: PULMONOLOGY | Facility: CLINIC | Age: 45
End: 2020-12-18
Payer: MEDICARE

## 2020-12-18 ENCOUNTER — APPOINTMENT (OUTPATIENT)
Dept: PULMONOLOGY | Facility: CLINIC | Age: 45
End: 2020-12-18

## 2020-12-18 VITALS — WEIGHT: 293 LBS | OXYGEN SATURATION: 94 % | BODY MASS INDEX: 68.75 KG/M2 | HEART RATE: 127 BPM

## 2020-12-18 VITALS — RESPIRATION RATE: 16 BRPM

## 2020-12-18 PROCEDURE — 99214 OFFICE O/P EST MOD 30 MIN: CPT

## 2020-12-18 NOTE — CONSULT LETTER
[Dear  ___] : Dear  [unfilled], [Courtesy Letter:] : I had the pleasure of seeing your patient, [unfilled], in my office today. [Please see my note below.] : Please see my note below. [Consult Closing:] : Thank you very much for allowing me to participate in the care of this patient.  If you have any questions, please do not hesitate to contact me. [Sincerely,] : Sincerely, [FreeTextEntry3] : Itzel Paniagua MD FCCP\par D-ABSM\par ABIM board certified in  Pulmonary diseases, Sleep medicine\par Internal medicine\par

## 2020-12-18 NOTE — REVIEW OF SYSTEMS
[Obesity] : obesity [Negative] : Neurologic [TextBox_30] : per HPI [TextBox_44] : per HPI [TextBox_137] : per HPI

## 2020-12-18 NOTE — PHYSICAL EXAM
[No Acute Distress] : no acute distress [Low Lying Soft Palate] : low lying soft palate [Elongated Uvula] : elongated uvula [Enlarged Base of the Tongue] : enlarged base of the tongue [III] : Mallampati Class: III [Normal Appearance] : normal appearance [Supple] : supple [Normal Rate/Rhythm] : normal rate/rhythm [Normal S1, S2] : normal s1, s2 [No Murmurs] : no murmurs [No Resp Distress] : no resp distress [Normal Rhythm and Effort] : normal rhythm and effort [Clear to Auscultation Bilaterally] : clear to auscultation bilaterally [Benign] : benign [Normal Gait] : normal gait [No Clubbing] : no clubbing [No Cyanosis] : no cyanosis [No Edema] : no edema [Normal Color/ Pigmentation] : normal color/ pigmentation [No Focal Deficits] : no focal deficits [Oriented x3] : oriented x3 [TextBox_89] : obese [TextBox_140] : anxious

## 2020-12-18 NOTE — ASSESSMENT
[FreeTextEntry1] : Patient has severe obstructive sleep apnea. BiPAP at 15/11 has been ordered and will be delivered to her house. Minimal acceptable use parameters were discussed with the patient. She will followup in 6 weeks to review compliance and efficacy. A repeat spirometry will be done at that time and final clearance for bariatric surgery will be given.

## 2020-12-18 NOTE — HISTORY OF PRESENT ILLNESS
[TextBox_4] : The patient came in today to discuss her recent sleep studies. She feels better on Symbicort. [Obstructive Sleep Apnea] : obstructive sleep apnea [TextBox_100] : 10/20 [TextBox_108] : 33 [TextBox_112] : 76 [TextBox_116] : 81 [TextBox_104] : 11/20 [TextBox_165] : I reviewed the patient's sleep study with the patient.\par The patient was therapeutic on BiPAP 15/11 and states she slept well with that during her titration study.

## 2021-01-04 ENCOUNTER — RX CHANGE (OUTPATIENT)
Age: 46
End: 2021-01-04

## 2021-01-04 ENCOUNTER — RX RENEWAL (OUTPATIENT)
Age: 46
End: 2021-01-04

## 2021-01-04 RX ORDER — INSULIN GLARGINE 100 [IU]/ML
100 INJECTION, SOLUTION SUBCUTANEOUS
Qty: 13 | Refills: 0 | Status: ACTIVE | COMMUNITY
Start: 2020-10-10 | End: 1900-01-01

## 2021-01-08 ENCOUNTER — TRANSCRIPTION ENCOUNTER (OUTPATIENT)
Age: 46
End: 2021-01-08

## 2021-01-13 NOTE — PATIENT PROFILE ADULT. - NS SC CAGE ALCOHOL EYE OPENER
[General Appearance - Alert] : alert [General Appearance - In No Acute Distress] : in no acute distress [Oriented To Time, Place, And Person] : oriented to person, place, and time [Impaired Insight] : insight and judgment were intact [Affect] : the affect was normal [Person] : oriented to person [Place] : oriented to place [Time] : oriented to time [Short Term Intact] : short term memory intact [Concentration Intact] : normal concentrating ability [Visual Intact] : visual attention was ~T not ~L decreased [Naming Objects] : no difficulty naming common objects [Repeating Phrases] : no difficulty repeating a phrase [Fluency] : fluency intact [Cranial Nerves Optic (II)] : visual acuity intact bilaterally,  visual fields full to confrontation, pupils equal round and reactive to light no [Cranial Nerves Oculomotor (III)] : extraocular motion intact [Cranial Nerves Trigeminal (V)] : facial sensation intact symmetrically [Cranial Nerves Facial (VII)] : face symmetrical [Cranial Nerves Vestibulocochlear (VIII)] : hearing was intact bilaterally [Cranial Nerves Glossopharyngeal (IX)] : tongue and palate midline [Cranial Nerves Accessory (XI - Cranial And Spinal)] : head turning and shoulder shrug symmetric [Cranial Nerves Hypoglossal (XII)] : there was no tongue deviation with protrusion [Motor Tone] : muscle tone was normal in all four extremities [Motor Strength] : muscle strength was normal in all four extremities [No Muscle Atrophy] : normal bulk in all four extremities [5] : ankle plantar flexion 5/5 [Sensation Tactile Decrease] : light touch was intact [Abnormal Walk] : normal gait [Balance] : balance was intact [2+] : Ankle jerk left 2+ [Sclera] : the sclera and conjunctiva were normal [PERRL With Normal Accommodation] : pupils were equal in size, round, reactive to light, with normal accommodation [Extraocular Movements] : extraocular movements were intact [General Appearance - Well Nourished] : well nourished [General Appearance - Well Developed] : well developed [Paresis Pronator Drift Right-Sided] : no pronator drift on the right [Paresis Pronator Drift Left-Sided] : no pronator drift on the left [Motor Strength Upper Extremities Bilaterally] : strength was normal in both upper extremities [Motor Strength Lower Extremities Bilaterally] : strength was normal in both lower extremities [Limited Balance] : balance was intact [Past-pointing] : there was no past-pointing [Tremor] : no tremor present [Dysdiadochokinesia Bilaterally] : not present [Coordination - Dysmetria Impaired Finger-to-Nose Bilateral] : not present [Coordination - Dysmetria Impaired Heel-to-Shin Bilateral] : not present [Plantar Reflex Right Only] : normal on the right [Plantar Reflex Left Only] : normal on the left

## 2021-01-18 ENCOUNTER — APPOINTMENT (OUTPATIENT)
Dept: ENDOCRINOLOGY | Facility: CLINIC | Age: 46
End: 2021-01-18

## 2021-02-04 ENCOUNTER — APPOINTMENT (OUTPATIENT)
Dept: FAMILY MEDICINE | Facility: CLINIC | Age: 46
End: 2021-02-04
Payer: MEDICARE

## 2021-02-04 ENCOUNTER — NON-APPOINTMENT (OUTPATIENT)
Age: 46
End: 2021-02-04

## 2021-02-04 PROCEDURE — 99441: CPT | Mod: 95

## 2021-02-28 ENCOUNTER — APPOINTMENT (OUTPATIENT)
Dept: DISASTER EMERGENCY | Facility: CLINIC | Age: 46
End: 2021-02-28

## 2021-03-01 LAB — SARS-COV-2 N GENE NPH QL NAA+PROBE: NOT DETECTED

## 2021-03-03 ENCOUNTER — APPOINTMENT (OUTPATIENT)
Dept: PULMONOLOGY | Facility: CLINIC | Age: 46
End: 2021-03-03

## 2021-03-03 ENCOUNTER — APPOINTMENT (OUTPATIENT)
Dept: PULMONOLOGY | Facility: CLINIC | Age: 46
End: 2021-03-03
Payer: MEDICARE

## 2021-03-03 VITALS
HEART RATE: 99 BPM | RESPIRATION RATE: 16 BRPM | SYSTOLIC BLOOD PRESSURE: 138 MMHG | OXYGEN SATURATION: 96 % | DIASTOLIC BLOOD PRESSURE: 76 MMHG

## 2021-03-03 DIAGNOSIS — Z87.891 PERSONAL HISTORY OF NICOTINE DEPENDENCE: ICD-10-CM

## 2021-03-03 DIAGNOSIS — E66.01 MORBID (SEVERE) OBESITY DUE TO EXCESS CALORIES: ICD-10-CM

## 2021-03-03 DIAGNOSIS — F31.9 BIPOLAR DISORDER, UNSPECIFIED: ICD-10-CM

## 2021-03-03 DIAGNOSIS — R06.02 SHORTNESS OF BREATH: ICD-10-CM

## 2021-03-03 DIAGNOSIS — G47.33 OBSTRUCTIVE SLEEP APNEA (ADULT) (PEDIATRIC): ICD-10-CM

## 2021-03-03 DIAGNOSIS — F17.200 NICOTINE DEPENDENCE, UNSPECIFIED, UNCOMPLICATED: ICD-10-CM

## 2021-03-03 PROCEDURE — 99214 OFFICE O/P EST MOD 30 MIN: CPT | Mod: 25

## 2021-03-03 PROCEDURE — 94060 EVALUATION OF WHEEZING: CPT

## 2021-03-22 ENCOUNTER — RX RENEWAL (OUTPATIENT)
Age: 46
End: 2021-03-22

## 2021-03-22 RX ORDER — BLOOD SUGAR DIAGNOSTIC
STRIP MISCELLANEOUS
Qty: 400 | Refills: 1 | Status: ACTIVE | COMMUNITY
Start: 2020-02-19 | End: 1900-01-01

## 2021-03-24 RX ORDER — BLOOD SUGAR DIAGNOSTIC
STRIP MISCELLANEOUS
Qty: 4 | Refills: 1 | Status: ACTIVE | COMMUNITY
Start: 2020-12-16 | End: 1900-01-01

## 2021-03-29 ENCOUNTER — RX RENEWAL (OUTPATIENT)
Age: 46
End: 2021-03-29

## 2021-04-06 ENCOUNTER — APPOINTMENT (OUTPATIENT)
Dept: CARDIOLOGY | Facility: CLINIC | Age: 46
End: 2021-04-06
Payer: MEDICARE

## 2021-04-06 ENCOUNTER — NON-APPOINTMENT (OUTPATIENT)
Age: 46
End: 2021-04-06

## 2021-04-06 VITALS
RESPIRATION RATE: 17 BRPM | WEIGHT: 293 LBS | TEMPERATURE: 98 F | OXYGEN SATURATION: 97 % | BODY MASS INDEX: 51.91 KG/M2 | HEIGHT: 63 IN | SYSTOLIC BLOOD PRESSURE: 118 MMHG | HEART RATE: 83 BPM | DIASTOLIC BLOOD PRESSURE: 70 MMHG

## 2021-04-06 PROCEDURE — 93000 ELECTROCARDIOGRAM COMPLETE: CPT | Mod: 59

## 2021-04-06 PROCEDURE — 99213 OFFICE O/P EST LOW 20 MIN: CPT | Mod: 25

## 2021-04-06 RX ORDER — OLANZAPINE 10 MG/1
10 TABLET, FILM COATED ORAL
Qty: 60 | Refills: 0 | Status: DISCONTINUED | COMMUNITY
Start: 2020-06-17 | End: 2021-04-06

## 2021-04-06 RX ORDER — OLANZAPINE 5 MG/1
5 TABLET, FILM COATED ORAL
Qty: 30 | Refills: 0 | Status: DISCONTINUED | COMMUNITY
Start: 2020-09-16 | End: 2021-04-06

## 2021-04-06 RX ORDER — FLUOXETINE HYDROCHLORIDE 40 MG/1
40 CAPSULE ORAL
Refills: 0 | Status: DISCONTINUED | COMMUNITY
End: 2021-04-06

## 2021-04-06 RX ORDER — IBUPROFEN 800 MG/1
800 TABLET, FILM COATED ORAL
Qty: 20 | Refills: 0 | Status: DISCONTINUED | COMMUNITY
Start: 2020-08-20 | End: 2021-04-06

## 2021-04-06 NOTE — HISTORY OF PRESENT ILLNESS
[FreeTextEntry1] : 45F hx morbid obesity, asthma, Htn, Hld, DM2, asthma referred for evaluation of dyspnea on exertion.\par \par Dyspnea on exertion present for many months.  Was seeing a bariatric surgeon for possible surgery who recommended she have pulmonary and cardiac evaluations.  \par \par Update 4/6/21:\chencho Completed echo and NST, presents today for final pre-surgical cardiac risk assessment.  Bariatric surgery is planned later this month at [facility].  She has seen all of the other recommended specialists pre-operatively who have cleared her to proceed.\par \chencho Feels well with no new symptoms in the interval since last office visit.  Specifically She denies chest pain, dyspnea, exertional symptoms, orthopnea, lower extremity edema, palpitations, or syncope.

## 2021-04-06 NOTE — REVIEW OF SYSTEMS
[Negative] : Neurological [Shortness Of Breath] : no shortness of breath [Dyspnea on exertion] : not dyspnea during exertion [Chest  Pressure] : no chest pressure [Chest Pain] : no chest pain [Lower Ext Edema] : no extremity edema [Palpitations] : no palpitations

## 2021-04-08 ENCOUNTER — RX CHANGE (OUTPATIENT)
Age: 46
End: 2021-04-08

## 2021-04-19 ENCOUNTER — APPOINTMENT (OUTPATIENT)
Dept: FAMILY MEDICINE | Facility: CLINIC | Age: 46
End: 2021-04-19
Payer: MEDICARE

## 2021-04-19 VITALS
OXYGEN SATURATION: 97 % | HEIGHT: 63 IN | WEIGHT: 293 LBS | BODY MASS INDEX: 51.91 KG/M2 | TEMPERATURE: 98.9 F | RESPIRATION RATE: 14 BRPM | DIASTOLIC BLOOD PRESSURE: 78 MMHG | SYSTOLIC BLOOD PRESSURE: 122 MMHG | HEART RATE: 88 BPM

## 2021-04-19 DIAGNOSIS — Z01.818 ENCOUNTER FOR OTHER PREPROCEDURAL EXAMINATION: ICD-10-CM

## 2021-04-19 PROCEDURE — 99214 OFFICE O/P EST MOD 30 MIN: CPT | Mod: 25

## 2021-04-19 PROCEDURE — G0447 BEHAVIOR COUNSEL OBESITY 15M: CPT | Mod: 59

## 2021-04-19 NOTE — PAST MEDICAL HISTORY
[Definite ___ (Date)] : the last menstrual period was [unfilled] [Total Preg ___] : G[unfilled] [Live Births ___] : P[unfilled]  [Full Term ___] : Full Term: [unfilled] [Living ___] : Living: [unfilled]

## 2021-04-21 NOTE — PHYSICAL EXAM
[Soft] : abdomen soft [Non Tender] : non-tender [No HSM] : no HSM [Comprehensive Foot Exam Normal] : Right and left foot were examined and both feet are normal. No ulcers in either foot. Toes are normal and with full ROM.  Normal tactile sensation with monofilament testing throughout both feet [Normal] : no respiratory distress, lungs were clear to auscultation bilaterally and no accessory muscle use [de-identified] : very large/obese

## 2021-04-21 NOTE — REVIEW OF SYSTEMS
[Vision Problems] : vision problems [Fever] : no fever [Chills] : no chills [Fatigue] : no fatigue [Hot Flashes] : no hot flashes [Night Sweats] : no night sweats [Recent Change In Weight] : ~T no recent weight change [Discharge] : no discharge [Pain] : no pain [Redness] : no redness [Dryness] : no dryness  [Itching] : no itching [Earache] : no earache [Hearing Loss] : no hearing loss [Nosebleed] : no nosebleeds [Hoarseness] : no hoarseness [Nasal Discharge] : no nasal discharge [Sore Throat] : no sore throat [Postnasal Drip] : no postnasal drip [Chest Pain] : no chest pain [Palpitations] : no palpitations [Leg Claudication] : no leg claudication [Lower Ext Edema] : no lower extremity edema [Orthopnea] : no orthopnea [Paroxysmal Nocturnal Dyspnea] : no paroxysmal nocturnal dyspnea [Shortness Of Breath] : no shortness of breath [Wheezing] : no wheezing [Cough] : no cough [Dyspnea on Exertion] : no dyspnea on exertion [Abdominal Pain] : no abdominal pain [Nausea] : no nausea [Constipation] : no constipation [Diarrhea] : diarrhea [Vomiting] : no vomiting [Heartburn] : no heartburn [Melena] : no melena [Dysuria] : no dysuria [Incontinence] : no incontinence [Nocturia] : no nocturia [Poor Libido] : libido not poor [Hematuria] : no hematuria [Frequency] : no frequency [Vaginal Discharge] : no vaginal discharge [Dysmenorrhea] : no dysmenorrhea [Joint Pain] : no joint pain [Joint Stiffness] : no joint stiffness [Joint Swelling] : no joint swelling [Muscle Weakness] : no muscle weakness [Muscle Pain] : no muscle pain [Back Pain] : no back pain [Itching] : no itching [Mole Changes] : no mole changes [Nail Changes] : no nail changes [Hair Changes] : no hair changes [Skin Rash] : no skin rash [Headache] : no headache [Dizziness] : no dizziness [Fainting] : no fainting [Confusion] : no confusion [Memory Loss] : no memory loss [Unsteady Walking] : no ataxia [Suicidal] : not suicidal [Insomnia] : no insomnia [Anxiety] : no anxiety [Depression] : no depression [Easy Bleeding] : no easy bleeding [Easy Bruising] : no easy bruising [Swollen Glands] : no swollen glands [FreeTextEntry3] : wears glasses

## 2021-04-21 NOTE — COUNSELING
[Behavioral health counseling provided] : Behavioral health counseling provided [Sleep ___ hours/day] : Sleep [unfilled] hours/day [Engage in a relaxing activity] : Engage in a relaxing activity [Plan in advance] : Plan in advance [Risk of tobacco use and health benefits of smoking cessation discussed] : Risk of tobacco use and health benefits of smoking cessation discussed [Cessation strategies including cessation program discussed] : Cessation strategies including cessation program discussed [Use of nicotine replacement therapies and other medications discussed] : Use of nicotine replacement therapies and other medications discussed [Encouraged to pick a quit date and identify support needed to quit] : Encouraged to pick a quit date and identify support needed to quit [Willing to Quit Smoking] : Willing to quit smoking [Potential consequences of obesity discussed] : Potential consequences of obesity discussed [Benefits of weight loss discussed] : Benefits of weight loss discussed [Structured Weight Management Program suggested:] : Structured weight management program suggested [Encouraged to maintain food diary] : Encouraged to maintain food diary [Encouraged to increase physical activity] : Encouraged to increase physical activity [Encouraged to use exercise tracking device] : Encouraged to use exercise tracking device [Target Wt Loss Goal ___] : Weight Loss Goals: Target weight loss goal [unfilled] lbs [Weigh Self Weekly] : weigh self weekly [Decrease Portions] : decrease portions [____ min/wk Activity] : [unfilled] min/wk activity [Keep Food Diary] : keep food diary [Patient motivation] : Patient motivation [Good understanding] : Patient has a good understanding of lifestyle changes and steps needed to achieve self management goal [FreeTextEntry2] : no cigarette for more than 2 months, congratulated, and encouraged to continue no smoking [FreeTextEntry4] : 15

## 2021-04-21 NOTE — HISTORY OF PRESENT ILLNESS
[FreeTextEntry1] : Follow up visit and needs clearance for Gastric bypass sleeve [de-identified] : 46 y.o female with PMHx of obesity, asthma, diabetic,sleep apnea, dyslipidemia, bipolar.  Here today for f/u visit and for clearance for Gastric bypass sleeve on 04/26/21.  Reported feeling fine, trying to watch diet, walking, trying to move around.  Also f/u with endocrinologist, checking bs everyday.  Reported doing well on liquid diet at this time.  Reported was seen by cardiologist and pulmonologist, had some test done, reported doing fine at this time.  Denies cp, palpitation, SOB, dizziness, edema, n/v, headaches.  No polyuria, polydipsia, polyphagia.

## 2021-04-21 NOTE — HEALTH RISK ASSESSMENT
[No] : In the past 12 months have you used drugs other than those required for medical reasons? No [No falls in past year] : Patient reported no falls in the past year [0] : 1) Little interest or pleasure doing things: Not at all (0) [1] : 2) Feeling down, depressed, or hopeless for several days (1) [] : Yes [de-identified] : No smoking for more than 2 months [de-identified] : walking [de-identified] : Trying to watch diet

## 2021-04-21 NOTE — PLAN
[FreeTextEntry1] : Diabetes:  Continue with medications, continue to check bs, bring record,  continue to f/u with endocrinologist.\par \par Dyslipidemia:  Continue with Simvastatin 10 mg every evening, Discussed diet and exercise. \par \par Morbid Obesity:  Discussed the risk of obesity and the health benefits of loosing weight.  Encouraged to eat smaller portions 3-4 times/day, weight self daily, keep food diary, move around more.  \par \par Pre-op clearance:  Cleared by cardilogist, cleared by pulmonologist,  pre-op labs and EKG done and discussed.\par \par Asthma:  Continue with medications, continue to f/u with pulmonology.\par \par Health Maintenance:  Discussed LIfe style modification, Healthy diet, low salts, fats, sweets, less juices/sodas, fried food, cheese, butter.  More water, fruits, vegetables.    Encouraged to eat smaller portions 3-4 times/day, weight self daily, reported on liquid diet at this time, before surgery. doing well.  Encouraged to keep food diary, move around more.   Exercise, walking, running 30-60 minutes/day.  Encouraged to start slow and increase as tolerated.  Encouraged to sleep 8-9 hours/night, get plenty of rest.  Encouraged to keep social distance, wear mask, frequent hands washing.  Encouraged to continue to f/u with specialists.  \par \par F/U after surgery, than 12-14 weeks or prn. \par \par The patient was seen and cleared by cardiology, pulmonology for the surgery.  EKG and Lab results reviewed and discussed with patient.  Advised to monitor BS maggy/post-operatively.  Follow up with Endo, pulmo, cardio post operative.  The patient has been optimized for the proposed procedure.

## 2021-05-04 ENCOUNTER — APPOINTMENT (OUTPATIENT)
Dept: ENDOCRINOLOGY | Facility: CLINIC | Age: 46
End: 2021-05-04

## 2021-05-26 ENCOUNTER — APPOINTMENT (OUTPATIENT)
Dept: FAMILY MEDICINE | Facility: CLINIC | Age: 46
End: 2021-05-26
Payer: MEDICARE

## 2021-05-26 VITALS
WEIGHT: 293 LBS | SYSTOLIC BLOOD PRESSURE: 132 MMHG | HEIGHT: 63 IN | OXYGEN SATURATION: 98 % | TEMPERATURE: 97.8 F | RESPIRATION RATE: 14 BRPM | HEART RATE: 88 BPM | DIASTOLIC BLOOD PRESSURE: 86 MMHG | BODY MASS INDEX: 51.91 KG/M2

## 2021-05-26 DIAGNOSIS — Z00.00 ENCOUNTER FOR GENERAL ADULT MEDICAL EXAMINATION W/OUT ABNORMAL FINDINGS: ICD-10-CM

## 2021-05-26 DIAGNOSIS — J45.909 UNSPECIFIED ASTHMA, UNCOMPLICATED: ICD-10-CM

## 2021-05-26 DIAGNOSIS — E78.5 HYPERLIPIDEMIA, UNSPECIFIED: ICD-10-CM

## 2021-05-26 DIAGNOSIS — E11.65 TYPE 2 DIABETES MELLITUS WITH HYPERGLYCEMIA: ICD-10-CM

## 2021-05-26 DIAGNOSIS — E66.01 MORBID (SEVERE) OBESITY DUE TO EXCESS CALORIES: ICD-10-CM

## 2021-05-26 PROCEDURE — G0447 BEHAVIOR COUNSEL OBESITY 15M: CPT | Mod: 59

## 2021-05-26 PROCEDURE — 99214 OFFICE O/P EST MOD 30 MIN: CPT | Mod: 25

## 2021-05-26 NOTE — COUNSELING
[Potential consequences of obesity discussed] : Potential consequences of obesity discussed [Benefits of weight loss discussed] : Benefits of weight loss discussed [Structured Weight Management Program suggested:] : Structured weight management program suggested [Encouraged to maintain food diary] : Encouraged to maintain food diary [Encouraged to increase physical activity] : Encouraged to increase physical activity [Encouraged to use exercise tracking device] : Encouraged to use exercise tracking device [Target Wt Loss Goal ___] : Weight Loss Goals: Target weight loss goal [unfilled] lbs [Weigh Self Weekly] : weigh self weekly [Decrease Portions] : decrease portions [____ min/wk Activity] : [unfilled] min/wk activity [Keep Food Diary] : keep food diary [Patient motivation] : Patient motivation [Needs reinforcement, provided] : Patient needs reinforcement on understanding of lifestyle changes and steps needed to achieve self management goal; reinforcement was provided [Behavioral health counseling provided] : Behavioral health counseling provided [Sleep ___ hours/day] : Sleep [unfilled] hours/day [Engage in a relaxing activity] : Engage in a relaxing activity [Plan in advance] : Plan in advance [Risk of tobacco use and health benefits of smoking cessation discussed] : Risk of tobacco use and health benefits of smoking cessation discussed [Cessation strategies including cessation program discussed] : Cessation strategies including cessation program discussed [Use of nicotine replacement therapies and other medications discussed] : Use of nicotine replacement therapies and other medications discussed [Encouraged to pick a quit date and identify support needed to quit] : Encouraged to pick a quit date and identify support needed to quit [Willing to Quit Smoking] : Willing to quit smoking [FreeTextEntry2] : No cigarette for more 5 months [FreeTextEntry4] : 15

## 2021-05-26 NOTE — REVIEW OF SYSTEMS
[Fatigue] : fatigue [Fever] : no fever [Chills] : no chills [Hot Flashes] : no hot flashes [Night Sweats] : no night sweats [Recent Change In Weight] : ~T no recent weight change [Discharge] : no discharge [Pain] : no pain [Redness] : no redness [Dryness] : no dryness  [Vision Problems] : no vision problems [Itching] : no itching [Earache] : no earache [Hearing Loss] : no hearing loss [Nosebleed] : no nosebleeds [Hoarseness] : no hoarseness [Nasal Discharge] : no nasal discharge [Sore Throat] : no sore throat [Postnasal Drip] : no postnasal drip [Chest Pain] : no chest pain [Palpitations] : no palpitations [Leg Claudication] : no leg claudication [Lower Ext Edema] : no lower extremity edema [Orthopnea] : no orthopnea [Paroxysmal Nocturnal Dyspnea] : no paroxysmal nocturnal dyspnea [Shortness Of Breath] : no shortness of breath [Wheezing] : no wheezing [Cough] : no cough [Dyspnea on Exertion] : no dyspnea on exertion [Abdominal Pain] : no abdominal pain [Nausea] : no nausea [Constipation] : no constipation [Diarrhea] : diarrhea [Vomiting] : no vomiting [Heartburn] : no heartburn [Melena] : no melena [Dysuria] : no dysuria [Incontinence] : no incontinence [Nocturia] : no nocturia [Poor Libido] : libido not poor [Hematuria] : no hematuria [Frequency] : no frequency [Vaginal Discharge] : no vaginal discharge [Dysmenorrhea] : no dysmenorrhea [Joint Pain] : no joint pain [Joint Stiffness] : no joint stiffness [Joint Swelling] : no joint swelling [Muscle Weakness] : no muscle weakness [Muscle Pain] : no muscle pain [Back Pain] : no back pain [Itching] : no itching [Mole Changes] : no mole changes [Nail Changes] : no nail changes [Hair Changes] : no hair changes [Skin Rash] : no skin rash [Headache] : no headache [Dizziness] : no dizziness [Fainting] : no fainting [Confusion] : no confusion [Memory Loss] : no memory loss [Unsteady Walking] : no ataxia [Suicidal] : not suicidal [Insomnia] : no insomnia [Anxiety] : no anxiety [Depression] : no depression [Easy Bleeding] : no easy bleeding [Easy Bruising] : no easy bruising [Swollen Glands] : no swollen glands

## 2021-05-26 NOTE — PHYSICAL EXAM
[Normal] : affect was normal and insight and judgment were intact [Comprehensive Foot Exam Normal] : Right and left foot were examined and both feet are normal. No ulcers in either foot. Toes are normal and with full ROM.  Normal tactile sensation with monofilament testing throughout both feet [de-identified] : wears glasses [de-identified] : very large/obese, multiple dry healed wound, mid abd with 3 cm wound slightly erythematous around the edges, dry scab noted.

## 2021-05-26 NOTE — PLAN
[FreeTextEntry1] : Morbid obesity:  Discussed diet and exercise.  Discussed the risk of obesity and the health benefits of loosing weight.  Encouraged to eat smaller portions 3-4 times/day, keep food diary, weight self weekly, move around more.\par Congratulated for loosing 30 lbs after surgery on 4/26/21.  Encouraged to continue what she's doing and f/u with the weight clinic.  next appoint in 2 weeks. Script given for physical/aquatic therapy.\par \par Asthma/sleep disorder:  Continue to use medication, Albuterol inhaler 2 puffs every 4-6 hours prn, symbicort 2 puffs every evening.  Avoid mold, dust, pollen.  Encouraged to continue to use CPAP and f/u with pulmo/ENT.\par \par Diabetes:  Continue with medications,  Check Blood sugar, bring record.  Sent for labs: hgbA1c, glucose.\par \par Dyslipidemia:  Discussed diet and exercise, continue with medication Simvastatin 20 mg every HS, sent for lab: \par lipids profile.\par \par HM:  HM:  Discussed Life style modification, healthy diet, low salts, fats, sweets, less juices/sodas, butter, cheese, fried food.  More water, fruits vegetables.  Eat smaller portions 3-4 times per day, keep food diary, weight self weekly, move around more.  Exercise, walking 30-60 minutes per day.  Congratulated on loosing 30 lbs after surgery 4/26/21.  Encourage to continue to exercise and watch diet.  Encouraged to sleep 8-9 hours per night, get plenty of rest.  Encouraged to use CPAP as ordered.  Encouraged frequent hands washing, wear mask, keep social distance.  Continue to f/u with specialists and weight clinic.  Discussed the importance of taking medications as ordered.  Lab requisition:  CBC with diff, cmp, lipids, tsh, hgbA1c, to be done in 6-8 weeks.\par \par Bipolar:  Continue with psych medications, continue to f/u with psych/therapist.\par \par F/U Follow up in 12-14 weeks or prn.\par \par \par \par

## 2021-05-26 NOTE — HEALTH RISK ASSESSMENT
[No] : In the past 12 months have you used drugs other than those required for medical reasons? No [No falls in past year] : Patient reported no falls in the past year [0] : 1) Little interest or pleasure doing things: Not at all (0) [1] : 2) Feeling down, depressed, or hopeless for several days (1) [With Patient/Caregiver] : With Patient/Caregiver [] : Yes [de-identified] : no smoking for more than 5 months. [de-identified] : Trying to move around more [de-identified] : on pureed/soft diet at this time. [AdvancecareDate] : 5/26/21

## 2021-05-26 NOTE — HISTORY OF PRESENT ILLNESS
[FreeTextEntry1] : Follow up visit [de-identified] : 46 y.o female with PMHx of Asthma, obesity, diatetic, sleep apnea, dyslipidemia, bipolar.  Here today for f/u after Gastric bypass sleeve on 4/26/21.  Reported feeling fine, trying to increase activities, moving around more at home.  Watching diet, was on liquid diet, now trying pureed diet, slowly increasing to regular as tolerated.  Reported taking medications everyday, took medications today, checking BS at home, ranging between 140-160.   Also reported checking bp at home, ranging 146/88 - 130/78.  \par Denies cp, palpitation, sob, dizziness, edema, n/v, edema, headaches.  No polydipsia, polyphagia, polyuria.

## 2021-05-28 ENCOUNTER — RX RENEWAL (OUTPATIENT)
Age: 46
End: 2021-05-28

## 2021-06-10 ENCOUNTER — RX RENEWAL (OUTPATIENT)
Age: 46
End: 2021-06-10

## 2021-06-10 RX ORDER — ALBUTEROL SULFATE 2.5 MG/3ML
(2.5 MG/3ML) SOLUTION RESPIRATORY (INHALATION)
Qty: 375 | Refills: 0 | Status: ACTIVE | COMMUNITY
Start: 2020-09-20 | End: 1900-01-01

## 2021-06-10 RX ORDER — LANCETS 28 GAUGE
EACH MISCELLANEOUS
Qty: 400 | Refills: 1 | Status: ACTIVE | COMMUNITY
Start: 2020-12-16 | End: 1900-01-01

## 2021-06-16 ENCOUNTER — RX RENEWAL (OUTPATIENT)
Age: 46
End: 2021-06-16

## 2021-06-16 RX ORDER — METFORMIN HYDROCHLORIDE 500 MG/1
500 TABLET, COATED ORAL
Qty: 360 | Refills: 1 | Status: ACTIVE | COMMUNITY
Start: 2020-11-30 | End: 1900-01-01

## 2021-07-20 ENCOUNTER — NON-APPOINTMENT (OUTPATIENT)
Age: 46
End: 2021-07-20

## 2021-08-03 ENCOUNTER — NON-APPOINTMENT (OUTPATIENT)
Age: 46
End: 2021-08-03

## 2021-08-03 NOTE — ED CDU PROVIDER INITIAL DAY NOTE - BREATH SOUNDS
[facility], copied and sent to medical records, gave a copy to Sadia (RN abstracting chart for upcoming appointment).   WHEEZES

## 2021-08-05 ENCOUNTER — RX RENEWAL (OUTPATIENT)
Age: 46
End: 2021-08-05

## 2021-10-06 PROBLEM — I10 ESSENTIAL HYPERTENSION: Status: ACTIVE | Noted: 2020-02-19

## 2021-10-12 ENCOUNTER — RX RENEWAL (OUTPATIENT)
Age: 46
End: 2021-10-12

## 2021-10-12 RX ORDER — FAMOTIDINE 40 MG/1
40 TABLET, FILM COATED ORAL
Qty: 180 | Refills: 1 | Status: ACTIVE | COMMUNITY
Start: 2020-12-09 | End: 1900-01-01

## 2021-10-13 NOTE — DISCHARGE NOTE NURSING/CASE MANAGEMENT/SOCIAL WORK - NSDCVIVACCINE_GEN_ALL_CORE_FT
Feeling fine  Tolerating clears  abd soft NTND    PLAN- advance diet   Quality 226: Preventive Care And Screening: Tobacco Use: Screening And Cessation Intervention: Patient screened for tobacco use and is an ex/non-smoker Detail Level: Detailed No Vaccines Administered.

## 2021-11-09 ENCOUNTER — RX RENEWAL (OUTPATIENT)
Age: 46
End: 2021-11-09

## 2021-11-12 ENCOUNTER — RX RENEWAL (OUTPATIENT)
Age: 46
End: 2021-11-12

## 2022-01-14 ENCOUNTER — RX RENEWAL (OUTPATIENT)
Age: 47
End: 2022-01-14

## 2022-01-14 RX ORDER — INSULIN ASPART 100 [IU]/ML
100 INJECTION, SOLUTION INTRAVENOUS; SUBCUTANEOUS
Qty: 1 | Refills: 1 | Status: ACTIVE | COMMUNITY
Start: 2020-08-12 | End: 1900-01-01

## 2022-02-07 ENCOUNTER — RX RENEWAL (OUTPATIENT)
Age: 47
End: 2022-02-07

## 2022-03-07 NOTE — PATIENT PROFILE ADULT. - NS PRO INDICAT FLU
Relayed routine screening, was treated and was advised partner needed to be treated.   Patient is 18 years or older

## 2022-03-31 ENCOUNTER — RX RENEWAL (OUTPATIENT)
Age: 47
End: 2022-03-31

## 2022-03-31 RX ORDER — BUDESONIDE AND FORMOTEROL FUMARATE DIHYDRATE 160; 4.5 UG/1; UG/1
160-4.5 AEROSOL RESPIRATORY (INHALATION) TWICE DAILY
Qty: 3 | Refills: 1 | Status: ACTIVE | COMMUNITY
Start: 2020-10-07 | End: 1900-01-01

## 2022-04-29 NOTE — ED CDU PROVIDER SUBSEQUENT DAY NOTE - NS_EDPROVIDERDISPOUSERTYPE_ED_A_ED
"   Patient:   Danae Mora            MRN: [id number]            FIN: [id number]               Age:   80 years     Sex:  Female     :  1938   Associated Diagnoses:   Syncope; Myalgia; Acute UTI   Author:   Angel Banerjee MD      Basic Information   Time seen: Date & time 2019 13:06:00.   History source: Patient.   Arrival mode: Private vehicle.   History limitation: None.   Additional information: Chief Complaint from Nursing Triage Note : Chief Complaint   2019 13:03 CST       Chief Complaint           pt states " I feel like im about to pass out". reports fell and passed out on monday. c/o weakness and dizziness. hx of htn. GCS 15.   .      History of Present Illness   The patient presents with 79 y/o female who presents with potential syncopal episode monday, states dizziness and feels like she wants to pass out. cbg 117. sushant daniels and       I, Dr. Banerjee assumed care of the patient at 1456.  79 y/o CF with a hx of RA and HTN presents to the ED c/o episodes of weakness. Patient reports she had a possible syncopal episode on Monday. She states she has been feeling weak, dizzy, and having body aches for the past few weeks. She denies chest pain or SOB. Patient reports no change in medication. .  The onset was just prior to arrival.  The course/duration of symptoms is constant.  The location where the incident occurred was at home.  The exacerbating factor is none.  The relieving factor is none.  Risk factors consist of hypertension.  Prior episodes: occasional.  Therapy today: none.  Preceding symptoms: none.  Associated symptoms: dizziness, denies chest pain and denies shortness of breath.        Review of Systems   Constitutional symptoms:  Negative except as documented in HPI.   Skin symptoms:  Negative except as documented in HPI.   Eye symptoms:  Negative except as documented in HPI.   ENMT symptoms:  Negative except as documented in HPI.   Respiratory symptoms:  No " shortness of breath,    Cardiovascular symptoms:  Syncope, No chest pain,    Gastrointestinal symptoms:  Negative except as documented in HPI.   Genitourinary symptoms:  Negative except as documented in HPI.   Musculoskeletal symptoms:  bilateral LE pain.   Neurologic symptoms:  Dizziness, weakness.    Psychiatric symptoms:  Negative except as documented in HPI.   Endocrine symptoms:  Negative except as documented in HPI.   Hematologic/Lymphatic symptoms:  Negative except as documented in HPI.   Allergy/immunologic symptoms:  Negative except as documented in HPI.             Additional review of systems information: All other systems reviewed and otherwise negative.      Health Status   Allergies:    Allergic Reactions (Selected)  Severity Not Documented  Anabolic steroids- No reactions were documented.  CeleBREX- No reactions were documented.  Celecoxib- No reactions were documented.  Sulfamethoxazole- No reactions were documented..   Medications:  (Selected)   Prescriptions  Prescribed  NexIUM 40 mg oral delayed release capsule: 40 mg = 1 cap(s), Oral, Daily, # 30 cap(s), 2 Refill(s)  VESIcare 10 mg oral tablet: 10 mg = 1 tab(s), Oral, Daily, # 90 tab(s), 3 Refill(s), Pharmacy: [facility]  Zofran ODT 4 mg oral tablet, disintegratin mg = 1 tab(s), Oral, q4hr, PRN PRN nausea, # 24 tab(s), 0 Refill(s), Pharmacy: [id number]  amLODIPine 5 mg oral tablet: See Instructions, TAKE 1 TABLET TWICE A DAY, # 180 tab(s), 3 Refill(s), eRx: [facility]  hydrochlorothiazide 25 mg oral tablet: 25 mg = 1 tab(s), Oral, Daily, # 90 tab(s), 3 Refill(s), Pharmacy: [facility]  hydroxychloroquine 200 mg oral tablet: 200 mg = 1 tab(s), Oral, Daily, # 90 tab(s), 3 Refill(s), Pharmacy: [facility]  meloxicam 7.5 mg oral tablet: See Instructions, TAKE 1 TABLET DAILY, # 90 tab(s), 3 Refill(s), eRx: [facility]  metoprolol tartrate 25 mg oral tab: 25 mg = 1  tab(s), Oral, Daily, # 90 tab(s), 3 Refill(s), Pharmacy: [facility]  oxybutynin 5 mg oral tablet: 5 mg = 1 tab(s), Oral, Daily, # 30 tab(s), 1 Refill(s), Pharmacy: [id number]  solifenacin 10 mg oral tablet: 10 mg = 1 tab(s), Oral, Daily, # 30 tab(s), 11 Refill(s), Pharmacy: [id number]  traMADol 50 mg oral tablet: 50 mg = 1 tab(s), Oral, q8hr, PRN PRN for pain, Called into [facility], # 90 tab(s), 1 Refill(s), called to pharmacy (Rx)  Documented Medications  Documented  BRIMONIDINE  SOL 0.15%:   DORZOL/TIMOL SOL 22.3-6.8: 1 drop(s), Eye-Both, BID  atorvastatin 10 mg oral tablet: 10 mg = 1 tab(s), Oral, Daily, # 30 tab(s), 0 Refill(s)  latanoprost 0.005% ophthalmic solution: 1 drop(s), Eye-Both, qPM, 0 Refill(s).      Past Medical/ Family/ Social History   Medical history:    Active  HTN (hypertension) ([id number])  Resolved  Arthritis (716.90):  Resolved.  Glaucoma (67892221):  Resolved.  Polio ([id number]):  Resolved.  Reflux ([id number]):  Resolved.  UTI (urinary tract infection) ([id number]):  Resolved.  HTN (hypertension) ([id number]):  Resolved.  Glaucoma (62187750):  Resolved.  RA (rheumatoid arthritis) ([id number]):  Resolved..   Surgical history:    Cataract surgery ([id number]).  Hip replacement ([id number]).  Knee replacement ([id number]).  right foot surgery.  Excision of basal cell carcinoma ([id number]).  Comments:  12/15/2013 18:42 - Stephanie LOVING, Alejandra PAUL.  back and breast  left knee replacemant.  right foot broken.  tosilllectomy.  cataract surgery.  right hip replacement.   left knee surgery.  right foot surgery.  Cataract extraction ([id number])..   Family history:    Alcoholism  Father ()  Hypertension.  Mother  Glaucoma.  Sister  Brother  .   Social history:    Social & Psychosocial  Attending Attestation (For Attendings USE Only)... Habits    Alcohol  05/13/2014 Risk Assessment: Denies Alcohol Use    02/09/2016 Risk Assessment: Denies Alcohol Use    04/17/2018  Use: Current    Type: Wine    Frequency: 1-2 times per month    Has alcohol use interfered with work or home life? No    Do you ever drink more than intended? No    Has anyone been hurt or at risk by your drinking? 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    05/13/2014 Risk Assessment: No Risk    03/17/2015  Status: Retired    Exercise  05/13/2014 Risk Assessment: Does not exercise    03/17/2015  Duration (average number of minutes): 45    Times per week: Daily    Exercise type: Walking    Home/Environment  01/07/2014  Lives with: Spouse    Comment: pt is  - 01/07/2014 11:25 - Landen Mora LPN    05/13/2014 Risk Assessment: No Risk    03/17/2015  Lives with: Spouse    Nutrition/Health  05/13/2014 Risk Assessment: No Risk    03/17/2015  Type of diet: Regular    Sexual  05/13/2014 Risk Assessment: No Risk    03/17/2015  Sexually active: Yes    Substance Abuse  01/07/2014 Risk Assessment: Denies Substance Abuse      Comment: never - 03/17/2015 14:33 - Anita Womack    02/09/2016 Risk Assessment: Denies Substance Abuse    Tobacco  12/15/2013 Risk Assessment: Denies Tobacco Use    02/09/2016 Risk Assessment: Denies Tobacco Use    04/17/2018  Use: Never smoker    Comment: never - 03/17/2015 14:33 - Anita Womack    08/29/2018  Use: Never smoker.      Physical Examination               Vital Signs   Vital Signs   2/7/2019 14:30 CST       Peripheral Pulse Rate     65 bpm                             Heart Rate Monitored      63 bpm                             Respiratory Rate          28 br/min  HI                             SpO2                      97 %                             Oxygen Therapy            Room air                             Systolic Blood  Pressure   162 mmHg  HI                             Diastolic Blood Pressure  67 mmHg                             Mean Arterial Pressure, Cuff              99 mmHg    2/7/2019 13:03 CST       Temperature Oral          36.7 DegC                             Temperature Oral (calculated)             98.06 DegF                             Peripheral Pulse Rate     67 bpm                             Respiratory Rate          20 br/min                             SpO2                      95 %                             Oxygen Therapy            Room air                             Systolic Blood Pressure   153 mmHg  HI                             Diastolic Blood Pressure  61 mmHg  .   Measurements   2/7/2019 13:03 CST       Weight Dosing             52 kg                             Weight Measured and Calculated in Lbs     114.64 lb                             Weight Estimated          52 kg  .   Basic Oxygen Information   2/7/2019 14:30 CST       SpO2                      97 %                             Oxygen Therapy            Room air    2/7/2019 13:03 CST       SpO2                      95 %                             Oxygen Therapy            Room air  .   General:  Alert, no acute distress.    Skin:  Warm, dry.    Head:  Normocephalic, atraumatic.    Neck:  Supple, trachea midline, no JVD, no carotid bruit.    Eye:  Pupils are equal, round and reactive to light, extraocular movements are intact, normal conjunctiva, vision unchanged.    Ears, nose, mouth and throat:  Tympanic membranes clear, oral mucosa moist, no pharyngeal erythema or exudate.    Cardiovascular:  Regular rate and rhythm, No murmur, Normal peripheral perfusion.    Respiratory:  Lungs are clear to auscultation, breath sounds are equal.    Gastrointestinal:  Soft, Nontender, Non distended, Normal bowel sounds.    Back:  Nontender, Normal range of motion.    Musculoskeletal:  No swelling, Tenderness to palpation to the bilateral LE.     Neurological:  Alert and oriented to person, place, time, and situation, No focal neurological deficit observed, CN II-XII intact, normal sensory observed, normal motor observed, normal coordination observed, Speech: Normal.    Lymphatics:  No lymphadenopathy.   Psychiatric:  Cooperative, appropriate mood & affect.       Medical Decision Making   Documents reviewed:  Emergency department nurses' notes.   Orders  Launch Orders   Laboratory:  UA Total a reflex to culture (Order): Stat collect, Urine, 2/7/2019 13:07 CST, Nurse collect, Print Label By Order Location  Troponin-I (Order): Stat collect, 2/7/2019 13:07 CST, Blood, Lab Collect, Print Label By Order Location, 2/7/2019 13:07 CST  CMP (Order): Stat collect, 2/7/2019 13:07 CST, Blood, Lab Collect, Print Label By Order Location, 2/7/2019 13:07 CST  CBC w/ Auto Diff (Order): Now collect, 2/7/2019 13:07 CST, Blood, Lab Collect, Print Label By Order Location, 2/7/2019 13:07 CST  Patient Care:  Orthostatic Vital Signs (Order): 2/7/2019 13:07 CST  Radiology:  CT Head W/O Contrast (Order): Stat, 2/7/2019 13:07 CST, Dizziness , vertigo, syncope/dizziness, None, Wheelchair, Rad Type, Schedule this test  Cardiology:  EKG (Order): 2/7/2019 13:07 CST, Wheelchair, Standard Precautions, NOW, -1, -1, 2/7/2019 13:07 CST.   Electrocardiogram:  Time 2/7/2019 13:11:00, rate 62, normal sinus rhythm, No ST-T changes, no ectopy, normal OK & QRS intervals, EP Interp, QT interval V1-V3.    Results review:  Lab results : Lab View   2/7/2019 16:00 CST       UA Appear                 CLOUDY                             UA Color                  YELLOW                             UA Spec Grav              1.012                             UA Bili                   Negative                             UA pH                     6.5                             UA Urobilinogen           1.0                             UA Blood                  1+                             UA Glucose                 Negative                             UA Ketones                Negative                             UA Protein                Negative                             UA Nitrite                Negative                             UA Leuk Est               2+                             UA WBC                    45 /HPF  HI                             UA RBC                    8 /HPF  HI                             UA Bacteria               2+ /HPF                             UA Squam Epithelial       NONE SEEN    2/7/2019 13:45 CST       Sodium Lvl                140 mmol/L                             Potassium Lvl             3.6 mmol/L                             Chloride                  102 mmol/L                             CO2                       30.0 mmol/L                             Calcium Lvl               9.4 mg/dL                             Glucose Lvl               109 mg/dL  HI                             BUN                       30.0 mg/dL  HI                             Creatinine                1.17 mg/dL  HI                             eGFR-AA                   57 mL/min/1.73 m2  NA                             eGFR-MK                  47 mL/min/1.73 m2  NA                             Bili Total                0.4 mg/dL                             Bili Direct               0.10 mg/dL                             Bili Indirect             0.30 mg/dL                             AST                       16 unit/L                             ALT                       18 unit/L                             Alk Phos                  76 unit/L                             Total Protein             7.0 gm/dL                             Albumin Lvl               3.60 gm/dL                             Globulin                  3.40 gm/dL                             A/G Ratio                 1.1 ratio                             Total CK                  47 unit/L                             Troponin-I                 <0.02 ng/mL                             WBC                       4.8 x10(3)/mcL                             RBC                       4.08 x10(6)/mcL  LOW                             Hgb                       12.9 gm/dL                             Hct                       37.1 %                             Platelet                  198 x10(3)/mcL                             MCV                       90.9 fL                             MCH                       31.6 pg  HI                             MCHC                      34.8 gm/dL                             RDW                       12.1 %                             MPV                       10.5 fL                             Abs Neut                  3.36 x10(3)/mcL                             Neutro Auto               69 %  NA                             Lymph Auto                19 %  NA                             Mono Auto                 8 %  NA                             Eos Auto                  3 %  NA                             Abs Eos                   0.1 x10(3)/mcL                             Basophil Auto             1 %  NA                             Abs Neutro                3.36 x10(3)/mcL                             Abs Lymph                 0.9 x10(3)/mcL                             Abs Mono                  0.4 x10(3)/mcL                             Abs Baso                  0.0 x10(3)/mcL  .   Radiology results:  Rad Results (ST)  < 12 hrs   Accession: [id number]  Order: CT Head W/O Contrast  Report Dt/Tm: 02/07/2019 13:42  Report:      CLINICAL: Syncope.     TECHNIQUE: Sequential axial images were performed of the brain without  contrast.      Dose product length of 851 mGycm. Automated exposure control was  utilized to minimize radiation dose.     COMPARISON: February 19, 2016.      FINDINGS:  There is no intracranial mass effect, midline shift,  hydrocephalus or hemorrhage. There is no sulcal effacement or  low  attenuation changes to suggest recent large vessel territory  infarction.  Chronic appearing periventricular and subcortical white  matter low attenuation changes are consistent with chronic  microangiopathic ischemia. The ventricular system and sulcal markings  prominence is consistent with atrophy. There is no acute extra axial  fluid collection. Visualized paranasal sinuses are clear without  mucosal thickening, polypoidal abnormality or air-fluid levels.  Mastoid air cells aeration is optimal.      IMPRESSION:     1. No acute intracranial findings identified.  2. 18 microvascular ischemia and atrophy.    .      Reexamination/ Reevaluation   Vital signs   results included from flowsheet : Vital Signs   2/7/2019 16:33 CST       Peripheral Pulse Rate     65 bpm                             Heart Rate Monitored      64 bpm                             Respiratory Rate          21 br/min                             SpO2                      97 %                             Systolic Blood Pressure   166 mmHg  HI                             Diastolic Blood Pressure  72 mmHg                             Mean Arterial Pressure, Cuff              103 mmHg     His workup is unremarkable, but with a syncopal event earlier this week or syncopal today with the patient's age and unexplained reason, we will admit for observation on telemetry, serial enzymes.  We'll gently hydrate with mildly elevated renal disease which may be due to medications.      Impression and Plan   Diagnosis   Syncope (IWK41-[id number] R55)   Acute UTI (VEZ08-HN N39.0)   Myalgia (ZIF27-ZW M79.10)      Calls-Consults   -  Jaren Skaggs MD.   Plan   Condition: Stable.    Disposition: Admit time  2/7/2019 17:09:00, Place in Observation Telemetry Unit.    Notes: ILoida, acted solely as a scribe for and in the presence of Dr. Banerjee who performed the service., I  personally performed all of the above services as recorded in this  chart.

## 2022-05-27 ENCOUNTER — RX RENEWAL (OUTPATIENT)
Age: 47
End: 2022-05-27

## 2022-06-21 ENCOUNTER — RX RENEWAL (OUTPATIENT)
Age: 47
End: 2022-06-21

## 2022-08-14 NOTE — ED PROVIDER NOTE - PMH
Asthma    Bipolar 1 disorder    Diabetes    Morbid obesity    Psychiatric disorder    
PROVIDER:[TOKEN:[[id number]]]

## 2022-12-01 NOTE — ED ADULT NURSE NOTE - NSSUHOSCREENINGYN_ED_ALL_ED
Patient will try to maintain dry weight via following diet.   
Yes - the patient is able to be screened

## 2023-07-13 NOTE — ED ADULT TRIAGE NOTE - DIRECT TO ROOM CARE INITIATED:
PAST MEDICAL HISTORY:  Chronic GERD     Coronary artery disease involving coronary bypass graft of native heart without angina pectoris     Dementia     HTN (hypertension)      20-Sep-2020 20:01

## 2024-04-16 NOTE — ED CDU PROVIDER SUBSEQUENT DAY NOTE - RESPIRATORY DISTRESS
Render In Strict Bullet Format?: No Initiate Treatment: Vtama cream Discontinue Regimen: Desonide cream Detail Level: Zone Samples Given: vtama Initiate Treatment: Clindamycin gel no

## 2024-12-17 ENCOUNTER — EMERGENCY (EMERGENCY)
Facility: HOSPITAL | Age: 49
LOS: 1 days | Discharge: HOME | End: 2024-12-17
Attending: EMERGENCY MEDICINE
Payer: COMMERCIAL

## 2024-12-17 VITALS
WEIGHT: 271.83 LBS | OXYGEN SATURATION: 97 % | RESPIRATION RATE: 20 BRPM | DIASTOLIC BLOOD PRESSURE: 92 MMHG | SYSTOLIC BLOOD PRESSURE: 138 MMHG | HEART RATE: 113 BPM | TEMPERATURE: 98 F

## 2024-12-17 LAB
RAPID RVP RESULT: SIGNIFICANT CHANGE UP
SARS-COV-2 RNA SPEC QL NAA+PROBE: SIGNIFICANT CHANGE UP

## 2024-12-17 PROCEDURE — 0225U NFCT DS DNA&RNA 21 SARSCOV2: CPT

## 2024-12-17 PROCEDURE — 94640 AIRWAY INHALATION TREATMENT: CPT

## 2024-12-17 PROCEDURE — 99284 EMERGENCY DEPT VISIT MOD MDM: CPT

## 2024-12-17 PROCEDURE — 99283 EMERGENCY DEPT VISIT LOW MDM: CPT | Mod: 25

## 2024-12-17 PROCEDURE — 71046 X-RAY EXAM CHEST 2 VIEWS: CPT

## 2024-12-17 PROCEDURE — 71046 X-RAY EXAM CHEST 2 VIEWS: CPT | Mod: 26

## 2024-12-17 RX ORDER — PREDNISONE 20 MG/1
1 TABLET ORAL
Qty: 12 | Refills: 0
Start: 2024-12-17 | End: 2024-12-22

## 2024-12-17 RX ORDER — IPRATROPIUM BROMIDE AND ALBUTEROL SULFATE 2.5; .5 MG/3ML; MG/3ML
3 SOLUTION RESPIRATORY (INHALATION) ONCE
Refills: 0 | Status: COMPLETED | OUTPATIENT
Start: 2024-12-17 | End: 2024-12-17

## 2024-12-17 RX ORDER — ALBUTEROL 90 MCG
3 AEROSOL (GRAM) INHALATION
Qty: 36 | Refills: 0
Start: 2024-12-17 | End: 2025-01-15

## 2024-12-17 RX ORDER — PREDNISONE 20 MG/1
60 TABLET ORAL ONCE
Refills: 0 | Status: COMPLETED | OUTPATIENT
Start: 2024-12-17 | End: 2024-12-17

## 2024-12-17 RX ADMIN — PREDNISONE 60 MILLIGRAM(S): 20 TABLET ORAL at 14:45

## 2024-12-17 RX ADMIN — IPRATROPIUM BROMIDE AND ALBUTEROL SULFATE 3 MILLILITER(S): 2.5; .5 SOLUTION RESPIRATORY (INHALATION) at 14:46

## 2024-12-17 NOTE — ED PROVIDER NOTE - NSFOLLOWUPINSTRUCTIONS_ED_ALL_ED_FT
use albuterol as needed  prednisone: 3 pills once a day x 2 days, 2 pills once a day x 2 days, 1 pill once a day x 2 days  return to ED if symptoms worsen

## 2024-12-17 NOTE — ED PROVIDER NOTE - ATTENDING APP SHARED VISIT CONTRIBUTION OF CARE
49-year-old female history of asthma presents with cough, sinus pressure.patient had vomiting and diarrhea which is now resolved.  On exam patient have diffuse wheezing.  Chest x-ray clear.  RVP sent.  Patient DuoNeb and prednisone with improvement.  Comfortable going home with DuoNebs and prednisone.

## 2024-12-17 NOTE — ED PROVIDER NOTE - OBJECTIVE STATEMENT
50 y/o F with history of asthma c/o cough, sinus pressure and vomiting and diarrhea which has now resolved.  Denies chest pain.

## 2024-12-17 NOTE — ED PROVIDER NOTE - PATIENT PORTAL LINK FT
You can access the FollowMyHealth Patient Portal offered by [facility] by registering at the following website: [email or web address]. By joining [employer]’s FollowMyHealth portal, you will also be able to view your health information using other applications (apps) compatible with our system.

## 2024-12-17 NOTE — ED ADULT TRIAGE NOTE - TEMPERATURE IN FAHRENHEIT (DEGREES F)
"Chief Complaint   Patient presents with     A.D.H.D     Health Maintenance       Initial /73  Pulse 69  Temp 97  F (36.1  C) (Oral)  Resp 20  Ht 6' 1\" (1.854 m)  Wt 200 lb (90.7 kg)  SpO2 100%  BMI 26.39 kg/m2 Estimated body mass index is 26.39 kg/(m^2) as calculated from the following:    Height as of this encounter: 6' 1\" (1.854 m).    Weight as of this encounter: 200 lb (90.7 kg).  Medication Reconciliation: complete   Shama Stacy CMA    " 98.5

## 2024-12-17 NOTE — ED ADULT NURSE NOTE - NSFALLUNIVINTERV_ED_ALL_ED
Bed/Stretcher in lowest position, wheels locked, appropriate side rails in place/Call bell, personal items and telephone in reach/Instruct patient to call for assistance before getting out of bed/chair/stretcher/Non-slip footwear applied when patient is off stretcher/[place] to call system/Physically safe environment - no spills, clutter or unnecessary equipment/Purposeful proactive rounding/Room/bathroom lighting operational, light cord in reach

## 2024-12-17 NOTE — ED PROVIDER NOTE - CLINICAL SUMMARY MEDICAL DECISION MAKING FREE TEXT BOX
50 y/o F with asthma exacerbation - wheezing on exam - feels better after nebulizer - no pneumonia seen on cxr

## 2024-12-20 ENCOUNTER — EMERGENCY (EMERGENCY)
Facility: HOSPITAL | Age: 49
LOS: 1 days | End: 2024-12-20
Attending: EMERGENCY MEDICINE
Payer: COMMERCIAL

## 2024-12-20 VITALS
DIASTOLIC BLOOD PRESSURE: 80 MMHG | TEMPERATURE: 98 F | SYSTOLIC BLOOD PRESSURE: 122 MMHG | RESPIRATION RATE: 17 BRPM | HEART RATE: 97 BPM | OXYGEN SATURATION: 96 %

## 2024-12-20 LAB
ALBUMIN SERPL ELPH-MCNC: 4.1 G/DL — SIGNIFICANT CHANGE UP (ref 3.3–5.2)
ALP SERPL-CCNC: 144 U/L — HIGH (ref 40–120)
ALT FLD-CCNC: 26 U/L — SIGNIFICANT CHANGE UP
ANION GAP SERPL CALC-SCNC: 15 MMOL/L — SIGNIFICANT CHANGE UP (ref 5–17)
AST SERPL-CCNC: 20 U/L — SIGNIFICANT CHANGE UP
BASOPHILS # BLD AUTO: 0.05 K/UL — SIGNIFICANT CHANGE UP (ref 0–0.2)
BASOPHILS NFR BLD AUTO: 0.5 % — SIGNIFICANT CHANGE UP (ref 0–2)
BILIRUB SERPL-MCNC: <0.2 MG/DL — LOW (ref 0.4–2)
BUN SERPL-MCNC: 13.8 MG/DL — SIGNIFICANT CHANGE UP (ref 8–20)
CALCIUM SERPL-MCNC: 9 MG/DL — SIGNIFICANT CHANGE UP (ref 8.4–10.5)
CHLORIDE SERPL-SCNC: 101 MMOL/L — SIGNIFICANT CHANGE UP (ref 96–108)
CO2 SERPL-SCNC: 23 MMOL/L — SIGNIFICANT CHANGE UP (ref 22–29)
CREAT SERPL-MCNC: 0.82 MG/DL — SIGNIFICANT CHANGE UP (ref 0.5–1.3)
EGFR: 87 ML/MIN/1.73M2 — SIGNIFICANT CHANGE UP
EOSINOPHIL # BLD AUTO: 0.21 K/UL — SIGNIFICANT CHANGE UP (ref 0–0.5)
EOSINOPHIL NFR BLD AUTO: 2 % — SIGNIFICANT CHANGE UP (ref 0–6)
GLUCOSE SERPL-MCNC: 291 MG/DL — HIGH (ref 70–99)
HCG SERPL-ACNC: 4.6 MIU/ML — SIGNIFICANT CHANGE UP
HCT VFR BLD CALC: 39.2 % — SIGNIFICANT CHANGE UP (ref 34.5–45)
HGB BLD-MCNC: 13.3 G/DL — SIGNIFICANT CHANGE UP (ref 11.5–15.5)
IMM GRANULOCYTES NFR BLD AUTO: 0.8 % — SIGNIFICANT CHANGE UP (ref 0–0.9)
LYMPHOCYTES # BLD AUTO: 3.65 K/UL — HIGH (ref 1–3.3)
LYMPHOCYTES # BLD AUTO: 35.2 % — SIGNIFICANT CHANGE UP (ref 13–44)
MCHC RBC-ENTMCNC: 28.2 PG — SIGNIFICANT CHANGE UP (ref 27–34)
MCHC RBC-ENTMCNC: 33.9 G/DL — SIGNIFICANT CHANGE UP (ref 32–36)
MCV RBC AUTO: 83.2 FL — SIGNIFICANT CHANGE UP (ref 80–100)
MONOCYTES # BLD AUTO: 0.72 K/UL — SIGNIFICANT CHANGE UP (ref 0–0.9)
MONOCYTES NFR BLD AUTO: 6.9 % — SIGNIFICANT CHANGE UP (ref 2–14)
NEUTROPHILS # BLD AUTO: 5.66 K/UL — SIGNIFICANT CHANGE UP (ref 1.8–7.4)
NEUTROPHILS NFR BLD AUTO: 54.6 % — SIGNIFICANT CHANGE UP (ref 43–77)
PLATELET # BLD AUTO: 231 K/UL — SIGNIFICANT CHANGE UP (ref 150–400)
POTASSIUM SERPL-MCNC: 4.1 MMOL/L — SIGNIFICANT CHANGE UP (ref 3.5–5.3)
POTASSIUM SERPL-SCNC: 4.1 MMOL/L — SIGNIFICANT CHANGE UP (ref 3.5–5.3)
PROT SERPL-MCNC: 6.9 G/DL — SIGNIFICANT CHANGE UP (ref 6.6–8.7)
RBC # BLD: 4.71 M/UL — SIGNIFICANT CHANGE UP (ref 3.8–5.2)
RBC # FLD: 13 % — SIGNIFICANT CHANGE UP (ref 10.3–14.5)
SODIUM SERPL-SCNC: 139 MMOL/L — SIGNIFICANT CHANGE UP (ref 135–145)
WBC # BLD: 10.37 K/UL — SIGNIFICANT CHANGE UP (ref 3.8–10.5)
WBC # FLD AUTO: 10.37 K/UL — SIGNIFICANT CHANGE UP (ref 3.8–10.5)

## 2024-12-20 PROCEDURE — 99285 EMERGENCY DEPT VISIT HI MDM: CPT | Mod: 25

## 2024-12-20 PROCEDURE — 93005 ELECTROCARDIOGRAM TRACING: CPT

## 2024-12-20 PROCEDURE — 80053 COMPREHEN METABOLIC PANEL: CPT

## 2024-12-20 PROCEDURE — 94640 AIRWAY INHALATION TREATMENT: CPT

## 2024-12-20 PROCEDURE — 93010 ELECTROCARDIOGRAM REPORT: CPT

## 2024-12-20 PROCEDURE — 71046 X-RAY EXAM CHEST 2 VIEWS: CPT | Mod: 26

## 2024-12-20 PROCEDURE — 99285 EMERGENCY DEPT VISIT HI MDM: CPT

## 2024-12-20 PROCEDURE — 96375 TX/PRO/DX INJ NEW DRUG ADDON: CPT

## 2024-12-20 PROCEDURE — 84702 CHORIONIC GONADOTROPIN TEST: CPT

## 2024-12-20 PROCEDURE — 85025 COMPLETE CBC W/AUTO DIFF WBC: CPT

## 2024-12-20 PROCEDURE — 96365 THER/PROPH/DIAG IV INF INIT: CPT

## 2024-12-20 PROCEDURE — 36415 COLL VENOUS BLD VENIPUNCTURE: CPT

## 2024-12-20 PROCEDURE — 71046 X-RAY EXAM CHEST 2 VIEWS: CPT

## 2024-12-20 RX ORDER — METHYLPREDNISOLONE SOD SUCC 125 MG
125 VIAL (EA) INJECTION ONCE
Refills: 0 | Status: COMPLETED | OUTPATIENT
Start: 2024-12-20 | End: 2024-12-20

## 2024-12-20 RX ORDER — IPRATROPIUM BROMIDE AND ALBUTEROL SULFATE 2.5; .5 MG/3ML; MG/3ML
3 SOLUTION RESPIRATORY (INHALATION)
Refills: 0 | Status: COMPLETED | OUTPATIENT
Start: 2024-12-20 | End: 2024-12-20

## 2024-12-20 RX ORDER — AZITHROMYCIN 250 MG/1
1 TABLET, FILM COATED ORAL
Qty: 1 | Refills: 0
Start: 2024-12-20 | End: 2024-12-24

## 2024-12-20 RX ORDER — PREDNISONE 20 MG/1
2 TABLET ORAL
Qty: 4 | Refills: 0
Start: 2024-12-20 | End: 2024-12-21

## 2024-12-20 RX ADMIN — IPRATROPIUM BROMIDE AND ALBUTEROL SULFATE 3 MILLILITER(S): 2.5; .5 SOLUTION RESPIRATORY (INHALATION) at 11:04

## 2024-12-20 RX ADMIN — Medication 2 GRAM(S): at 12:47

## 2024-12-20 RX ADMIN — Medication 125 MILLIGRAM(S): at 11:03

## 2024-12-20 RX ADMIN — IPRATROPIUM BROMIDE AND ALBUTEROL SULFATE 3 MILLILITER(S): 2.5; .5 SOLUTION RESPIRATORY (INHALATION) at 11:45

## 2024-12-20 RX ADMIN — Medication 150 GRAM(S): at 11:19

## 2024-12-20 RX ADMIN — IPRATROPIUM BROMIDE AND ALBUTEROL SULFATE 3 MILLILITER(S): 2.5; .5 SOLUTION RESPIRATORY (INHALATION) at 11:25

## 2024-12-20 NOTE — ED PROVIDER NOTE - PROGRESS NOTE DETAILS
MD Alesia: Patient seen and reassessed, is feeling better. No longer wheezing on reassessment, Conversation had with patient regarding results of testing. Patient agrees with plan for discharge at this time. Patient agrees to comply with follow up with PCP. Return to ED precautions and discharge instructions given to patient.

## 2024-12-20 NOTE — ED PROVIDER NOTE - ATTENDING CONTRIBUTION TO CARE
Stewart: I performed a face to face evaluation of this patient and performed a full history and physical examination on the patient.  I agree with the resident's history, physical examination, and plan of the patient unless otherwise noted. My brief assessment is as follows: hx smoker/copd p/w several days diarrhea, cough, congestion, resolved fever (temp 102 on wednesday). here 12/18 for similar, given steroids with some improvement. continued symptoms. no other complaints. non toxic, satting well, bl wheezing, no retractions. rrr, abd benign, no swelling b/l LE. steroids, mag, albuterol, suspect likely copd exacerbation secondary to viral uri. reassess

## 2024-12-20 NOTE — ED PROVIDER NOTE - PHYSICAL EXAMINATION
General: Awake, alert, lying in bed in NAD  HEENT: Normocephalic, atraumatic. No scleral icterus or conjunctival injection. EOMI. Moist mucous membranes. Oropharynx clear.   Neck:. Soft and supple.  Cardiac: RRR, Peripheral pulses 2+ and symmetric. No LE edema.  Resp: +coughing, rhonchi b/l. No accessory muscle use.  Abd: Soft, non-tender, non-distended. No guarding, rebound, or rigidity.  Back: Spine midline and non-tender.   Skin: No rashes, abrasions, or lacerations.  Neuro: AO x 4. Moves all extremities symmetrically. Motor strength and sensation grossly intact.  Psych: Appropriate mood and affect

## 2024-12-20 NOTE — ED PROVIDER NOTE - PATIENT PORTAL LINK FT
You can access the FollowMyHealth Patient Portal offered by [facility] by registering at the following website: [email or web address]. By joining [employer]’s FollowMyHealth portal, you will also be able to view your health information using other applications (apps) compatible with our system.

## 2024-12-20 NOTE — ED PROVIDER NOTE - CLINICAL SUMMARY MEDICAL DECISION MAKING FREE TEXT BOX
49-year-old female PMHx COPD not on O2, asthma presenting with cough, sneezing, wheezing, chills, diarrhea that began a week ago.      will give nebs, solu-medrol, get labs, CXR, reassess.

## 2024-12-20 NOTE — ED PROVIDER NOTE - OBJECTIVE STATEMENT
49-year-old female PMHx COPD not on O2, asthma presenting with cough, sneezing, wheezing, chills, diarrhea that began a week ago.  Patient was seen in the ED 12/18 for the same.  Patient reports that symptoms have not been relieved with the prednisone and albuterol that she was at home with.  Called her doctor this morning who told her to go back to the hospital.  States that she had a fever at home.  Endorses chills.  Denies headache, chest pain, back pain, abdominal pain, N/V, denies urinary symptoms.

## 2025-02-12 ENCOUNTER — APPOINTMENT (OUTPATIENT)
Dept: OTOLARYNGOLOGY | Facility: CLINIC | Age: 50
End: 2025-02-12
Payer: MEDICARE

## 2025-02-12 ENCOUNTER — NON-APPOINTMENT (OUTPATIENT)
Age: 50
End: 2025-02-12

## 2025-02-12 VITALS — WEIGHT: 278 LBS | HEIGHT: 63 IN | BODY MASS INDEX: 49.26 KG/M2

## 2025-02-12 VITALS — SYSTOLIC BLOOD PRESSURE: 135 MMHG | HEART RATE: 106 BPM | DIASTOLIC BLOOD PRESSURE: 84 MMHG

## 2025-02-12 DIAGNOSIS — J32.2 CHRONIC ETHMOIDAL SINUSITIS: ICD-10-CM

## 2025-02-12 DIAGNOSIS — H90.3 SENSORINEURAL HEARING LOSS, BILATERAL: ICD-10-CM

## 2025-02-12 DIAGNOSIS — H61.23 IMPACTED CERUMEN, BILATERAL: ICD-10-CM

## 2025-02-12 DIAGNOSIS — H69.93 UNSPECIFIED EUSTACHIAN TUBE DISORDER, BILATERAL: ICD-10-CM

## 2025-02-12 PROCEDURE — 99203 OFFICE O/P NEW LOW 30 MIN: CPT | Mod: 25

## 2025-02-12 PROCEDURE — 92557 COMPREHENSIVE HEARING TEST: CPT

## 2025-02-12 PROCEDURE — 92567 TYMPANOMETRY: CPT | Mod: 22

## 2025-02-12 PROCEDURE — 31231 NASAL ENDOSCOPY DX: CPT

## 2025-02-12 PROCEDURE — G0268 REMOVAL OF IMPACTED WAX MD: CPT

## 2025-02-12 RX ORDER — PREDNISONE 10 MG/1
10 TABLET ORAL TWICE DAILY
Qty: 15 | Refills: 2 | Status: ACTIVE | COMMUNITY
Start: 2025-02-12 | End: 1900-01-01

## 2025-02-12 RX ORDER — TIRZEPATIDE 7.5 MG/.5ML
INJECTION, SOLUTION SUBCUTANEOUS
Refills: 0 | Status: ACTIVE | COMMUNITY

## 2025-02-27 ENCOUNTER — APPOINTMENT (OUTPATIENT)
Dept: PULMONOLOGY | Facility: CLINIC | Age: 50
End: 2025-02-27
Payer: MEDICARE

## 2025-02-27 VITALS
HEIGHT: 62 IN | HEART RATE: 108 BPM | RESPIRATION RATE: 16 BRPM | WEIGHT: 270 LBS | SYSTOLIC BLOOD PRESSURE: 129 MMHG | OXYGEN SATURATION: 96 % | DIASTOLIC BLOOD PRESSURE: 77 MMHG | BODY MASS INDEX: 49.69 KG/M2

## 2025-02-27 VITALS — WEIGHT: 270 LBS | HEIGHT: 62 IN | BODY MASS INDEX: 49.69 KG/M2

## 2025-02-27 DIAGNOSIS — G47.33 OBSTRUCTIVE SLEEP APNEA (ADULT) (PEDIATRIC): ICD-10-CM

## 2025-02-27 PROCEDURE — 99204 OFFICE O/P NEW MOD 45 MIN: CPT | Mod: 25

## 2025-02-27 PROCEDURE — 94010 BREATHING CAPACITY TEST: CPT

## 2025-03-25 NOTE — ED PROVIDER NOTE - NS ED MD DISPO DISCHARGE
Medication: lexapro passed protocol.   Last office visit date: 6/4/24  Next appointment scheduled?: No;  not due    Number of refills given: 90 day     Home

## 2025-03-29 NOTE — ED CDU PROVIDER INITIAL DAY NOTE - CARDIOVASCULAR [-], MLM
Refill Routing Note   Medication(s) are not appropriate for processing by [facility] for the following reason(s):        Non-participating provider    [facility] action(s):  Route             Appointments  past 12m or future 3m with PCP    Date Provider   Last Visit   10/22/2024 Jonh Navarro, NP   Next Visit   Visit date not found Jonh Navarro, NP   ED visits in past 90 days: 0        Note composed:10:41 AM 03/29/2025                no chest pain/no palpitations

## 2025-04-17 ENCOUNTER — APPOINTMENT (OUTPATIENT)
Dept: PULMONOLOGY | Facility: CLINIC | Age: 50
End: 2025-04-17
Payer: MEDICARE

## 2025-04-17 VITALS
SYSTOLIC BLOOD PRESSURE: 126 MMHG | HEIGHT: 62.25 IN | HEART RATE: 110 BPM | DIASTOLIC BLOOD PRESSURE: 74 MMHG | BODY MASS INDEX: 47.06 KG/M2 | OXYGEN SATURATION: 98 % | RESPIRATION RATE: 16 BRPM | WEIGHT: 259 LBS

## 2025-04-17 DIAGNOSIS — G47.33 OBSTRUCTIVE SLEEP APNEA (ADULT) (PEDIATRIC): ICD-10-CM

## 2025-04-17 DIAGNOSIS — F31.9 BIPOLAR DISORDER, UNSPECIFIED: ICD-10-CM

## 2025-04-17 DIAGNOSIS — E66.01 MORBID (SEVERE) OBESITY DUE TO EXCESS CALORIES: ICD-10-CM

## 2025-04-17 PROCEDURE — G2211 COMPLEX E/M VISIT ADD ON: CPT

## 2025-04-17 PROCEDURE — 99214 OFFICE O/P EST MOD 30 MIN: CPT

## 2025-04-17 RX ORDER — QUETIAPINE 100 MG/1
100 TABLET, FILM COATED ORAL
Refills: 0 | Status: ACTIVE | COMMUNITY

## 2025-04-28 ENCOUNTER — OUTPATIENT (OUTPATIENT)
Dept: OUTPATIENT SERVICES | Facility: HOSPITAL | Age: 50
LOS: 1 days | End: 2025-04-28
Payer: MEDICARE

## 2025-04-28 VITALS
RESPIRATION RATE: 18 BRPM | DIASTOLIC BLOOD PRESSURE: 68 MMHG | OXYGEN SATURATION: 96 % | TEMPERATURE: 98 F | HEIGHT: 62 IN | HEART RATE: 93 BPM | SYSTOLIC BLOOD PRESSURE: 98 MMHG | WEIGHT: 251.33 LBS

## 2025-04-28 DIAGNOSIS — Z01.818 ENCOUNTER FOR OTHER PREPROCEDURAL EXAMINATION: ICD-10-CM

## 2025-04-28 DIAGNOSIS — G47.33 OBSTRUCTIVE SLEEP APNEA (ADULT) (PEDIATRIC): ICD-10-CM

## 2025-04-28 DIAGNOSIS — I10 ESSENTIAL (PRIMARY) HYPERTENSION: ICD-10-CM

## 2025-04-28 DIAGNOSIS — E11.9 TYPE 2 DIABETES MELLITUS WITHOUT COMPLICATIONS: ICD-10-CM

## 2025-04-28 DIAGNOSIS — Z98.84 BARIATRIC SURGERY STATUS: Chronic | ICD-10-CM

## 2025-04-28 DIAGNOSIS — Z29.9 ENCOUNTER FOR PROPHYLACTIC MEASURES, UNSPECIFIED: ICD-10-CM

## 2025-04-28 DIAGNOSIS — Z12.11 ENCOUNTER FOR SCREENING FOR MALIGNANT NEOPLASM OF COLON: ICD-10-CM

## 2025-04-28 LAB
A1C WITH ESTIMATED AVERAGE GLUCOSE RESULT: 9.5 % — HIGH (ref 4–5.6)
ANION GAP SERPL CALC-SCNC: 16 MMOL/L — SIGNIFICANT CHANGE UP (ref 5–17)
APTT BLD: 32.9 SEC — SIGNIFICANT CHANGE UP (ref 26.1–36.8)
BASOPHILS # BLD AUTO: 0.04 K/UL — SIGNIFICANT CHANGE UP (ref 0–0.2)
BASOPHILS NFR BLD AUTO: 0.6 % — SIGNIFICANT CHANGE UP (ref 0–2)
BUN SERPL-MCNC: 9.8 MG/DL — SIGNIFICANT CHANGE UP (ref 8–20)
CALCIUM SERPL-MCNC: 8.9 MG/DL — SIGNIFICANT CHANGE UP (ref 8.4–10.5)
CHLORIDE SERPL-SCNC: 101 MMOL/L — SIGNIFICANT CHANGE UP (ref 96–108)
CO2 SERPL-SCNC: 22 MMOL/L — SIGNIFICANT CHANGE UP (ref 22–29)
CREAT SERPL-MCNC: 0.89 MG/DL — SIGNIFICANT CHANGE UP (ref 0.5–1.3)
EGFR: 78 ML/MIN/1.73M2 — SIGNIFICANT CHANGE UP
EGFR: 78 ML/MIN/1.73M2 — SIGNIFICANT CHANGE UP
EOSINOPHIL # BLD AUTO: 0.13 K/UL — SIGNIFICANT CHANGE UP (ref 0–0.5)
EOSINOPHIL NFR BLD AUTO: 2.1 % — SIGNIFICANT CHANGE UP (ref 0–6)
ESTIMATED AVERAGE GLUCOSE: 226 MG/DL — HIGH (ref 68–114)
GLUCOSE SERPL-MCNC: 158 MG/DL — HIGH (ref 70–99)
HCT VFR BLD CALC: 41.7 % — SIGNIFICANT CHANGE UP (ref 34.5–45)
HGB BLD-MCNC: 14 G/DL — SIGNIFICANT CHANGE UP (ref 11.5–15.5)
IMM GRANULOCYTES # BLD AUTO: 0.01 K/UL — SIGNIFICANT CHANGE UP (ref 0–0.07)
IMM GRANULOCYTES NFR BLD AUTO: 0.2 % — SIGNIFICANT CHANGE UP (ref 0–0.9)
INR BLD: 0.94 RATIO — SIGNIFICANT CHANGE UP (ref 0.85–1.16)
LYMPHOCYTES # BLD AUTO: 1.84 K/UL — SIGNIFICANT CHANGE UP (ref 1–3.3)
LYMPHOCYTES NFR BLD AUTO: 29.3 % — SIGNIFICANT CHANGE UP (ref 13–44)
MCHC RBC-ENTMCNC: 28.6 PG — SIGNIFICANT CHANGE UP (ref 27–34)
MCHC RBC-ENTMCNC: 33.6 G/DL — SIGNIFICANT CHANGE UP (ref 32–36)
MCV RBC AUTO: 85.3 FL — SIGNIFICANT CHANGE UP (ref 80–100)
MONOCYTES # BLD AUTO: 0.48 K/UL — SIGNIFICANT CHANGE UP (ref 0–0.9)
MONOCYTES NFR BLD AUTO: 7.7 % — SIGNIFICANT CHANGE UP (ref 2–14)
NEUTROPHILS # BLD AUTO: 3.77 K/UL — SIGNIFICANT CHANGE UP (ref 1.8–7.4)
NEUTROPHILS NFR BLD AUTO: 60.1 % — SIGNIFICANT CHANGE UP (ref 43–77)
NRBC # BLD AUTO: 0 K/UL — SIGNIFICANT CHANGE UP (ref 0–0)
NRBC # FLD: 0 K/UL — SIGNIFICANT CHANGE UP (ref 0–0)
NRBC BLD AUTO-RTO: 0 /100 WBCS — SIGNIFICANT CHANGE UP (ref 0–0)
PLATELET # BLD AUTO: 221 K/UL — SIGNIFICANT CHANGE UP (ref 150–400)
PMV BLD: 8.5 FL — SIGNIFICANT CHANGE UP (ref 7–13)
POTASSIUM SERPL-MCNC: 4.4 MMOL/L — SIGNIFICANT CHANGE UP (ref 3.5–5.3)
POTASSIUM SERPL-SCNC: 4.4 MMOL/L — SIGNIFICANT CHANGE UP (ref 3.5–5.3)
PROTHROM AB SERPL-ACNC: 10.6 SEC — SIGNIFICANT CHANGE UP (ref 9.9–13.4)
RBC # BLD: 4.89 M/UL — SIGNIFICANT CHANGE UP (ref 3.8–5.2)
RBC # FLD: 12.9 % — SIGNIFICANT CHANGE UP (ref 10.3–14.5)
SODIUM SERPL-SCNC: 139 MMOL/L — SIGNIFICANT CHANGE UP (ref 135–145)
WBC # BLD: 6.27 K/UL — SIGNIFICANT CHANGE UP (ref 3.8–10.5)
WBC # FLD AUTO: 6.27 K/UL — SIGNIFICANT CHANGE UP (ref 3.8–10.5)

## 2025-04-28 PROCEDURE — 93010 ELECTROCARDIOGRAM REPORT: CPT

## 2025-04-28 PROCEDURE — 85025 COMPLETE CBC W/AUTO DIFF WBC: CPT

## 2025-04-28 PROCEDURE — G0463: CPT

## 2025-04-28 PROCEDURE — 80048 BASIC METABOLIC PNL TOTAL CA: CPT

## 2025-04-28 PROCEDURE — 93005 ELECTROCARDIOGRAM TRACING: CPT

## 2025-04-28 PROCEDURE — 36415 COLL VENOUS BLD VENIPUNCTURE: CPT

## 2025-04-28 PROCEDURE — 85610 PROTHROMBIN TIME: CPT

## 2025-04-28 PROCEDURE — 83036 HEMOGLOBIN GLYCOSYLATED A1C: CPT

## 2025-04-28 PROCEDURE — 85730 THROMBOPLASTIN TIME PARTIAL: CPT

## 2025-04-28 RX ORDER — AMLODIPINE BESYLATE 10 MG/1
1 TABLET ORAL
Refills: 0 | DISCHARGE

## 2025-04-28 RX ORDER — TRAZODONE HCL 100 MG
0 TABLET ORAL
Refills: 0 | DISCHARGE

## 2025-04-28 RX ORDER — OLANZAPINE 10 MG/1
1 TABLET ORAL
Refills: 0 | DISCHARGE

## 2025-04-28 RX ORDER — ATORVASTATIN CALCIUM 80 MG/1
1 TABLET, FILM COATED ORAL
Refills: 0 | DISCHARGE

## 2025-04-28 RX ORDER — LISINOPRIL 5 MG/1
1 TABLET ORAL
Refills: 0 | DISCHARGE

## 2025-04-28 RX ORDER — EZETIMIBE 10 MG/1
1 TABLET ORAL
Refills: 0 | DISCHARGE

## 2025-04-28 RX ORDER — MONTELUKAST SODIUM 10 MG/1
1 TABLET ORAL
Refills: 0 | DISCHARGE

## 2025-04-28 RX ORDER — LAMOTRIGINE 150 MG/1
1 TABLET ORAL
Refills: 0 | DISCHARGE

## 2025-04-28 RX ORDER — FLUOXETINE HYDROCHLORIDE 20 MG/1
1 CAPSULE ORAL
Refills: 0 | DISCHARGE

## 2025-04-28 RX ORDER — METFORMIN HYDROCHLORIDE 850 MG/1
1 TABLET ORAL
Refills: 0 | DISCHARGE

## 2025-04-28 RX ORDER — DULOXETINE 20 MG/1
1 CAPSULE, DELAYED RELEASE ORAL
Refills: 0 | DISCHARGE

## 2025-04-28 RX ORDER — OMEPRAZOLE 20 MG/1
1 CAPSULE, DELAYED RELEASE ORAL
Refills: 0 | DISCHARGE

## 2025-04-28 RX ORDER — ARIPIPRAZOLE 2 MG/1
1 TABLET ORAL
Refills: 0 | DISCHARGE

## 2025-04-28 RX ORDER — HYDROXYZINE HYDROCHLORIDE 25 MG/1
1 TABLET, FILM COATED ORAL
Refills: 0 | DISCHARGE

## 2025-04-28 RX ORDER — QUETIAPINE FUMARATE 25 MG/1
1 TABLET ORAL
Refills: 0 | DISCHARGE

## 2025-04-28 RX ORDER — TIRZEPATIDE 7.5 MG/.5ML
10 INJECTION, SOLUTION SUBCUTANEOUS
Refills: 0 | DISCHARGE

## 2025-04-28 NOTE — H&P PST ADULT - NSICDXFAMILYHX_GEN_ALL_CORE_FT
FAMILY HISTORY:  Mother  Still living? Unknown  Family history of diabetes mellitus (DM), Age at diagnosis: Age Unknown    Sibling  Still living? Yes, Estimated age: 51-60  Family history of diabetes mellitus (DM), Age at diagnosis: Age Unknown    Grandparent  Still living? Unknown  FHx: colon cancer, Age at diagnosis: Age Unknown

## 2025-04-28 NOTE — H&P PST ADULT - NSICDXPASTMEDICALHX_GEN_ALL_CORE_FT
PAST MEDICAL HISTORY:  Asthma     Bipolar 1 disorder     Diabetes     Encounter for screening for malignant neoplasm of colon     Morbid obesity     Psychiatric disorder

## 2025-04-28 NOTE — H&P PST ADULT - ASSESSMENT
Pt is a 50 years old female, seen today pre-op for Colonoscopy for screening for malignant neoplasm of colon, this is the Pt first time screening. Pt  medical hx includes HTN, Asthma, DMT2, hemorrhoid( intermittent rectal bleeding), LUDIN ( CPAP Machine malfunction about 6 months ago, Pt under the care of dr. Lancaster), Bipolar disorder, anxiety, depression. Pt denies personal hx of malignant neoplasm, reports family hx of colon cancer. Pt scheduled for this procedure on 25 with Dr. Hubbard. Surgery protocol reviewed with Pt today. Pt to follow up with PCP, Pulmonologist for evaluation and clearance prior to this surgery. Pt reports she stopped Mounjaro on 25 as instructed by Stevie laguerre's office prior to this procedure, last dose of Metformin 25. Medication reconciliation and dr. Xavier reviewed with patient in conjunction with home medication list, dosage reviewed no discrepancies found    Patient instructed on NPO protocol   Patient instructed to stop NSAID, all vitamins supplement, Fish oil, COQ 10,  herbals 5 days before this surgery.   Pt okay to take Tylenol as needed for pain   Patient will continue to take all his medications as prescribed    Patient instructed on infection prevention   CAPRINI VTE 2.0 SCORE [CLOT updated 2019]    AGE RELATED RISK FACTORS                                                       MOBILITY RELATED FACTORS  [ X] Age 41-60 years                                            (1 Point)                    [ ] Bed rest                                                        (1 Point)  [ ] Age: 61-74 years                                           (2 Points)                  [ ] Plaster cast                                                   (2 Points)  [ ] Age= 75 years                                              (3 Points)                    [ ] Bed bound for more than 72 hours                 (2 Points)    DISEASE RELATED RISK FACTORS                                               GENDER SPECIFIC FACTORS  [ ] Edema in the lower extremities                       (1 Point)              [ ] Pregnancy                                                     (1 Point)  [ ] Varicose veins                                               (1 Point)                     [ ] Post-partum < 6 weeks                                   (1 Point)             [X ] BMI > 25 Kg/m2                                            (1 Point)                     [ ] Hormonal therapy  or oral contraception          (1 Point)                 [ ] Sepsis (in the previous month)                        (1 Point)               [ ] History of pregnancy complications                 (1 point)  [ ] Pneumonia or serious lung disease                                               [ ] Unexplained or recurrent                     (1 Point)           (in the previous month)                               (1 Point)  [ ] Abnormal pulmonary function test                     (1 Point)                 SURGERY RELATED RISK FACTORS  [ ] Acute myocardial infarction                              (1 Point)               [ ]  Section                                             (1 Point)  [ ] Congestive heart failure (in the previous month)  (1 Point)      [ ] Minor surgery                                                  (1 Point)   [ ] Inflammatory bowel disease                             (1 Point)               [ ] Arthroscopic surgery                                        (2 Points)  [ ] Central venous access                                      (2 Points)                [ X] General surgery lasting more than 45 minutes (2 points)  [ ] Malignancy- Present or previous                   (2 Points)                [ ] Elective arthroplasty                                         (5 points)    [ ] Stroke (in the previous month)                          (5 Points)                                                                                                                                                           HEMATOLOGY RELATED FACTORS                                                 TRAUMA RELATED RISK FACTORS  [ ] Prior episodes of VTE                                     (3 Points)                [ ] Fracture of the hip, pelvis, or leg                       (5 Points)  [ ] Positive family history for VTE                         (3 Points)             [ ] Acute spinal cord injury (in the previous month)  (5 Points)  [ ] Prothrombin 47835 A                                     (3 Points)               [ ] Paralysis  (less than 1 month)                             (5 Points)  [ ] Factor V Leiden                                             (3 Points)                  [ ] Multiple Trauma within 1 month                        (5 Points)  [ ] Lupus anticoagulants                                     (3 Points)                                                           [ ] Anticardiolipin antibodies                               (3 Points)                                                       [ ] High homocysteine in the blood                      (3 Points)                                             [ ] Other congenital or acquired thrombophilia      (3 Points)                                                [ ] Heparin induced thrombocytopenia                  (3 Points)                                     Total Score [     4     ]  OPIOID RISK TOOL    HUMERA EACH BOX THAT APPLIES AND ADD TOTALS AT THE END    FAMILY HISTORY OF SUBSTANCE ABUSE                 FEMALE         MALE                                                Alcohol                             [  ]1 pt          [  ]3pts                                               Illegal Durgs                     [  ]2 pts        [  ]3pts                                               Rx Drugs                           [  ]4 pts        [  ]4 pts    PERSONAL HISTORY OF SUBSTANCE ABUSE                                                                                          Alcohol                             [  ]3 pts       [  ]3 pts                                               Illegal Drugs                     [  ]4 pts        [  ]4 pts                                               Rx Drugs                           [  ]5 pts        [  ]5 pts    AGE BETWEEN 16-45 YEARS                                      [  ]1 pt         [  ]1 pt    HISTORY OF PREADOLESCENT   SEXUAL ABUSE                                                             [  ]3 pts        [  ]0pts    PSYCHOLOGICAL DISEASE                     ADD, OCD, Bipolar, Schizophrenia        [ X ]2 pts         [  ]2 pts                      Depression                                               [X ]1 pt           [  ]1 pt           SCORING TOTAL   (add numbers and type here)              (**3*)                                     A score of 3 or lower indicated LOW risk for future opioid abuse  A score of 4 to 7 indicated moderate risk for future opioid abuse  A score of 8 or higher indicates a high risk for opioid abuse

## 2025-04-28 NOTE — H&P PST ADULT - RX
CPAP Machine( currently broken about 6 months ago), Appt with Pulmonologist  dr. Lancaster on 4/29/25

## 2025-04-30 ENCOUNTER — APPOINTMENT (OUTPATIENT)
Dept: PULMONOLOGY | Facility: CLINIC | Age: 50
End: 2025-04-30
Payer: MEDICARE

## 2025-04-30 PROCEDURE — 94010 BREATHING CAPACITY TEST: CPT

## 2025-05-01 PROCEDURE — 88305 TISSUE EXAM BY PATHOLOGIST: CPT | Mod: 26

## 2025-05-02 ENCOUNTER — OUTPATIENT (OUTPATIENT)
Dept: OUTPATIENT SERVICES | Facility: HOSPITAL | Age: 50
LOS: 1 days | End: 2025-05-02
Payer: MEDICARE

## 2025-05-02 ENCOUNTER — TRANSCRIPTION ENCOUNTER (OUTPATIENT)
Age: 50
End: 2025-05-02

## 2025-05-02 VITALS
HEART RATE: 83 BPM | TEMPERATURE: 97 F | DIASTOLIC BLOOD PRESSURE: 55 MMHG | OXYGEN SATURATION: 99 % | RESPIRATION RATE: 18 BRPM | SYSTOLIC BLOOD PRESSURE: 114 MMHG

## 2025-05-02 VITALS
OXYGEN SATURATION: 98 % | SYSTOLIC BLOOD PRESSURE: 117 MMHG | TEMPERATURE: 97 F | HEART RATE: 76 BPM | DIASTOLIC BLOOD PRESSURE: 81 MMHG | RESPIRATION RATE: 15 BRPM

## 2025-05-02 DIAGNOSIS — Z01.818 ENCOUNTER FOR OTHER PREPROCEDURAL EXAMINATION: ICD-10-CM

## 2025-05-02 DIAGNOSIS — Z12.11 ENCOUNTER FOR SCREENING FOR MALIGNANT NEOPLASM OF COLON: ICD-10-CM

## 2025-05-02 DIAGNOSIS — Z98.84 BARIATRIC SURGERY STATUS: Chronic | ICD-10-CM

## 2025-05-02 LAB — GLUCOSE BLDC GLUCOMTR-MCNC: 134 MG/DL — HIGH (ref 70–99)

## 2025-05-02 PROCEDURE — 45380 COLONOSCOPY AND BIOPSY: CPT | Mod: XS,PT

## 2025-05-02 PROCEDURE — 88305 TISSUE EXAM BY PATHOLOGIST: CPT

## 2025-05-02 PROCEDURE — 82962 GLUCOSE BLOOD TEST: CPT

## 2025-05-02 PROCEDURE — 45385 COLONOSCOPY W/LESION REMOVAL: CPT | Mod: PT

## 2025-05-02 DEVICE — NAIL OSTEO 1.5X16MM STRL: Type: IMPLANTABLE DEVICE | Status: FUNCTIONAL

## 2025-05-05 LAB — SURGICAL PATHOLOGY STUDY: SIGNIFICANT CHANGE UP

## 2025-05-10 ENCOUNTER — OUTPATIENT (OUTPATIENT)
Dept: OUTPATIENT SERVICES | Facility: HOSPITAL | Age: 50
LOS: 1 days | End: 2025-05-10
Payer: MEDICARE

## 2025-05-10 DIAGNOSIS — Z98.84 BARIATRIC SURGERY STATUS: Chronic | ICD-10-CM

## 2025-05-10 DIAGNOSIS — G47.33 OBSTRUCTIVE SLEEP APNEA (ADULT) (PEDIATRIC): ICD-10-CM

## 2025-05-10 PROCEDURE — 95810 POLYSOM 6/> YRS 4/> PARAM: CPT

## 2025-05-10 PROCEDURE — 95810 POLYSOM 6/> YRS 4/> PARAM: CPT | Mod: 26

## 2025-05-16 ENCOUNTER — OFFICE (OUTPATIENT)
Dept: URBAN - METROPOLITAN AREA CLINIC 115 | Facility: CLINIC | Age: 50
Setting detail: OPHTHALMOLOGY
End: 2025-05-16
Payer: MEDICARE

## 2025-05-16 DIAGNOSIS — H52.213: ICD-10-CM

## 2025-05-16 DIAGNOSIS — H35.3131: ICD-10-CM

## 2025-05-16 DIAGNOSIS — H40.013: ICD-10-CM

## 2025-05-16 DIAGNOSIS — H25.13: ICD-10-CM

## 2025-05-16 PROCEDURE — 92025 CPTRIZED CORNEAL TOPOGRAPHY: CPT | Performed by: OPHTHALMOLOGY

## 2025-05-16 PROCEDURE — 99204 OFFICE O/P NEW MOD 45 MIN: CPT | Performed by: OPHTHALMOLOGY

## 2025-05-16 PROCEDURE — 92250 FUNDUS PHOTOGRAPHY W/I&R: CPT | Performed by: OPHTHALMOLOGY

## 2025-05-16 ASSESSMENT — REFRACTION_AUTOREFRACTION
OD_SPHERE: -1.50
OD_CYLINDER: -3.25
OD_AXIS: 081
OS_CYLINDER: -3.25
OS_AXIS: 106
OS_SPHERE: -1.25

## 2025-05-16 ASSESSMENT — VISUAL ACUITY
OD_BCVA: 20/20
OS_BCVA: 20/20

## 2025-05-16 ASSESSMENT — CONFRONTATIONAL VISUAL FIELD TEST (CVF)
OS_FINDINGS: FULL
OD_FINDINGS: FULL

## 2025-05-16 ASSESSMENT — TONOMETRY
OS_IOP_MMHG: 21
OD_IOP_MMHG: 21

## 2025-06-21 NOTE — ED PROVIDER NOTE - NS ED MD DISPO DISCHARGE CCDA
1600...Patients daughter called into office regarding scheduling. States patient is due for wound care and they are unable to perform. Agreeable to visit on Sunday 6/22/25. 
Patient/Caregiver provided printed discharge information.

## 2025-06-24 ENCOUNTER — APPOINTMENT (OUTPATIENT)
Dept: PULMONOLOGY | Facility: CLINIC | Age: 50
End: 2025-06-24

## 2025-09-05 ENCOUNTER — EMERGENCY (EMERGENCY)
Facility: HOSPITAL | Age: 50
LOS: 1 days | End: 2025-09-05
Payer: MEDICARE

## 2025-09-05 VITALS
HEART RATE: 90 BPM | SYSTOLIC BLOOD PRESSURE: 110 MMHG | DIASTOLIC BLOOD PRESSURE: 70 MMHG | WEIGHT: 233.25 LBS | RESPIRATION RATE: 17 BRPM | OXYGEN SATURATION: 97 % | TEMPERATURE: 98 F

## 2025-09-05 DIAGNOSIS — Z98.84 BARIATRIC SURGERY STATUS: Chronic | ICD-10-CM

## 2025-09-05 PROBLEM — Z12.11 ENCOUNTER FOR SCREENING FOR MALIGNANT NEOPLASM OF COLON: Chronic | Status: ACTIVE | Noted: 2025-04-28

## 2025-09-05 PROCEDURE — 99283 EMERGENCY DEPT VISIT LOW MDM: CPT

## 2025-09-05 PROCEDURE — 99281 EMR DPT VST MAYX REQ PHY/QHP: CPT

## 2025-09-05 RX ORDER — DIAZEPAM 5 MG/1
1 TABLET ORAL
Qty: 7 | Refills: 0
Start: 2025-09-05 | End: 2025-09-11

## 2025-09-05 RX ORDER — QUETIAPINE FUMARATE 25 MG/1
1 TABLET ORAL
Qty: 14 | Refills: 0
Start: 2025-09-05 | End: 2025-09-18

## 2025-09-05 RX ORDER — MIRTAZAPINE 30 MG/1
1 TABLET, FILM COATED ORAL
Qty: 14 | Refills: 0
Start: 2025-09-05 | End: 2025-09-18

## (undated) DEVICE — POLY TRAP ETRAP

## (undated) DEVICE — UNDERPAD LINEN SAVER 23 X 36"

## (undated) DEVICE — SNARE LESIONHUNTER ROTAT NITNL COLD 15MM

## (undated) DEVICE — GOWN IMPERV XL

## (undated) DEVICE — FORCEP RADIAL JAW 4 W NDL 2.4MM 2.8MM 240CM ORANGE DISP

## (undated) DEVICE — PACK IV START WITH CHG

## (undated) DEVICE — DRSG 2X2

## (undated) DEVICE — SYR IV FLUSH SALINE 10ML 30/TY

## (undated) DEVICE — KIT DEFENDO 4 OLY 4 PC

## (undated) DEVICE — SOL IRR BAG H2O 1000ML

## (undated) DEVICE — SYR SLIP 10CC

## (undated) DEVICE — FORCEP RADIAL JAW 4 240CM DISP

## (undated) DEVICE — DENTURE CUP PINK

## (undated) DEVICE — DRSG CURITY GAUZE SPONGE 4 X 4" 12-PLY NON-STERILE

## (undated) DEVICE — TUBING IV EXTENSION MACRO W CLAVE 7"

## (undated) DEVICE — MASK PROCEDURE EARLOOP LVL 2 50/BX

## (undated) DEVICE — SOL BAG NS 0.9% 1000ML

## (undated) DEVICE — CATH IV SAFE BC 22G X 1" (BLUE)

## (undated) DEVICE — SYR LUER SLIP TIP 50CC

## (undated) DEVICE — TUBING ALARIS PUMP MODULE NON-DEHP

## (undated) DEVICE — SENSOR O2 FINGER ADULT